# Patient Record
Sex: FEMALE | Race: WHITE | NOT HISPANIC OR LATINO | Employment: FULL TIME | URBAN - METROPOLITAN AREA
[De-identification: names, ages, dates, MRNs, and addresses within clinical notes are randomized per-mention and may not be internally consistent; named-entity substitution may affect disease eponyms.]

---

## 2021-09-23 ENCOUNTER — TREATMENT (OUTPATIENT)
Dept: PHYSICAL THERAPY | Facility: CLINIC | Age: 31
End: 2021-09-23

## 2021-09-23 DIAGNOSIS — R26.81 UNSTEADY GAIT: ICD-10-CM

## 2021-09-23 DIAGNOSIS — Z47.89 ORTHOPEDIC AFTERCARE: Primary | ICD-10-CM

## 2021-09-23 DIAGNOSIS — M25.551 RIGHT HIP PAIN: ICD-10-CM

## 2021-09-23 PROCEDURE — 97110 THERAPEUTIC EXERCISES: CPT | Performed by: PHYSICAL THERAPIST

## 2021-09-23 PROCEDURE — 97162 PT EVAL MOD COMPLEX 30 MIN: CPT | Performed by: PHYSICAL THERAPIST

## 2021-09-23 NOTE — PROGRESS NOTES
Physical Therapy Initial Evaluation and Plan of Care      Patient: Maria R Chiu   : 1990  Diagnosis/ICD-10 Code:  Orthopedic aftercare [Z47.89]  Referring practitioner: Berna Blair*  Date of Initial Visit: 2021  Today's Date: 2021  Patient seen for 1 sessions           Subjective Evaluation    History of Present Illness  Date of surgery: 2021  Mechanism of injury: No DEANNA    Subjective comment: 3 wks post (R) hip arthroscopy with femoral decompression/ labral repair.  2/ 10 pain (R) hip pain  Patient Occupation:  at [employer] Quality of life: good    Pain  Current pain ratin  At best pain ratin  At worst pain ratin  Quality: dull ache, tight, throbbing, sharp, cramping and discomfort  Aggravating factors: ambulation, overhead activity, stairs, lifting, movement and standing    Patient Goals  Patient goals for therapy: increased strength, independence with ADLs/IADLs, return to work, increased motion, improved balance, decreased pain and decreased edema             Objective          Passive Range of Motion     Right Hip   Flexion: 90 degrees   Abduction: 10 degrees     Strength/Myotome Testing     Additional Strength Details  Deferred MMT (R) LE .  WB precaution of 30%. Ambulating with use of swx. Encouraged use of rolling wx . Reviewed correct sleeping position.  Reviewed precautions/ restricxtions.     General Comments     Hip Comments   5 min ambulation tolerance. Decreased independence with dressing/ grooming bathing tasks. Pain with all transfers 4 hr sleep tolerance.        See Exercise, Manual, and Modality Logs for complete treatment.       Functional Outcome Score: 100% impaired LE fx score        Assessment & Plan     Assessment  Impairments: abnormal gait, abnormal or restricted ROM, impaired balance, impaired physical strength, pain with function and safety issue  Assessment details: Maria R Chiu is a 31 y.o. year-old female referred to physical  therapy for (R) hip arthroscopy/ debridement/ decompression/ labral repair.  She presents with a evolving clinical presentation.  Pt reports 4/ 10 pain . Compliant with WB precautions. She has no  comorbidities and no personal factors that may affect her progress in the plan of care. Pt demonstrates decreased (R) hip AROM/ MMT throughout consistent with dx at this time.  Chief complaint decreased tolerance with safety functional mobility around her home.  Pt ambulating with use of swx 30% WB precautions (R) LE. Pt demonstrates Signs and symptoms are consistent with physical therapy diagnosis of unsteady gait/ (R) hip pain/ orthopedic aftercare   Prognosis: good  Functional Limitations: walking, uncomfortable because of pain, standing and stooping  Goals  Plan Goals: stg 6 wks    Pt to educated/ independent with initial HEP specific to PROM/ AAROM/ precautions.    Increase (R) hip PROM in flexion 90 degrees to allow for increased ease with dressing activities.    Pt to ambulate with correct gait sequence with use of appropriate AD consistently.    ltg 12 wks    Pt to deny pain with all transfers.    Decreased (R) hip pain at rest to 2/ 10 to allow for 6 hrs continuous sleep.    Increased (R) hip PROM in flexion 110 degrees/ 25 degrees in abduction to allow for increase ease with dressing/bathing activities.    Increase (R) hip MMT 4-/5 to allow for 20 min standing/ walking tolerance without evidence of increase pain/ LE fatigue.     Improve LE fx score by 25%  improve    Plan  Therapy options: will be seen for skilled physical therapy services  Planned modality interventions: cryotherapy, electrical stimulation/Russian stimulation, TENS, ultrasound and thermotherapy (hydrocollator packs)  Planned therapy interventions: abdominal trunk stabilization, manual therapy, motor coordination training, neuromuscular re-education, balance/weight-bearing training, ADL retraining, flexibility, functional ROM exercises, gait  training, home exercise program, joint mobilization, transfer training, therapeutic activities, strengthening, stretching and soft tissue mobilization  Frequency: 2x week  Duration in weeks: 12  Treatment plan discussed with: patient        Timed:  Manual Therapy:     mins  98290;  Therapeutic Exercise:   10      mins  96983;     Neuromuscular Rashida:        mins  99651;    Therapeutic Activity:          mins  59761;     Gait Training:           mins  70386;     Ultrasound:          mins  23370;    Electrical Stimulation:         mins  74949 ( );  Iontophoresis         mins 12257  Dry Needling        mins      Untimed:  Electrical Stimulation:         mins  91575 ( );  Mechanical Traction:         mins  28871;     Timed Treatment:40      mins   Total Treatment:     40   mins    PT SIGNATURE: David Ho, PT   DATE TREATMENT INITIATED: 9/23/2021    Initial Certification  Certification Period: 12/22/2021  I certify that the therapy services are furnished while this patient is under my care.  The services outlined above are required by this patient, and will be reviewed every 90 days.     PHYSICIAN: Berna العراقي PA      DATE:     Please sign and return via fax to [phone] Thank you, [facility].

## 2021-09-27 ENCOUNTER — TREATMENT (OUTPATIENT)
Dept: PHYSICAL THERAPY | Facility: CLINIC | Age: 31
End: 2021-09-27

## 2021-09-27 DIAGNOSIS — Z47.89 ORTHOPEDIC AFTERCARE: Primary | ICD-10-CM

## 2021-09-27 DIAGNOSIS — R26.81 UNSTEADY GAIT: ICD-10-CM

## 2021-09-27 DIAGNOSIS — M25.551 RIGHT HIP PAIN: ICD-10-CM

## 2021-09-27 PROCEDURE — 97110 THERAPEUTIC EXERCISES: CPT | Performed by: PHYSICAL THERAPIST

## 2021-09-27 PROCEDURE — 97530 THERAPEUTIC ACTIVITIES: CPT | Performed by: PHYSICAL THERAPIST

## 2021-09-27 NOTE — PROGRESS NOTES
Physical Therapy Daily Progress Note      Patient: Maria R Chiu   : 1990  Referring practitioner: Berna Blair*  Date of Initial Visit: Type: THERAPY  Noted: 2021  Today's Date: 2021  Patient seen for 2 sessions         Maria R Chiu reports: pain has been pretty controlled and her 4 weeks is up on Wednesday this week and she is hoping to progress to increased WBing through RLE.     Objective     Exercises:   NuStep: 5 min, seat 11, resistance level 1    Supine on therapy table  -Heel slides x 10   -Alt marches (from hooklying) with core activation x 10 each leg  -iso hip ADD with ball (hooklying) x 10 with 5 sec hold  -hip ABD (hooklying) x 10 with 5 sec hold  -SAQ over pillow x 10  -RLE clamshells in L sidelying x 10    Seated edge of therapy table:   -LAQ x 15  -Ankle circles CW/CCW x 10 each    Ther Activity:  Sit to/from stands from bench x 3 with RWx; cues/educated on hand placement on bench vs pulling up on RWx  SPS transfers with RWx w/c to/from Nustep and w/c to/from therapy mat with close S and cues for hand placement and technique; increased time due to PWB RLE.     Gait:  15 feet x 2 with RWx with partial WB on RLE; sig reliance via UE on RWx; cues/educated on gait mechanics until full WB      Medbridge HEP code: [id number]    Assessment/Plan  Continued partial WB at 30% RLE; progressed open chain exercises to limited WB but to improve RLE hip ROM and strength. Pt reports 4 week post op is Wednesday with hopeful progression of WB soon. Updated HEP and issued handout this date for improved carryover at home. Education on transfers and gait today; increased height of RWx to improve PWB RLE and ease of mobility. Pt also educated on supine positioning without knee flexion to stretch hip flexor and improve hip ext in prep for gait training in future sessions.        Progress per Plan of Care and Progress strengthening /stabilization /functional activity        Timed:  Manual Therapy:    -     mins  67472;  Therapeutic Exercise:    31     mins  88406;     Neuromuscular Rashida:    -    mins  38332;    Therapeutic Activity:     8     mins  76059;     Gait Trainin     mins  31436;     Ultrasound:     -     mins  56539;    Electrical Stimulation:    -     mins  73653 ( );  Dry needling:      -     mins  /    Untimed:  Electrical Stimulation:    -     mins  52140 ( );  Mechanical Traction:    -     mins  24895;     Timed Treatment:   44   mins   Total Treatment:     46   mins  Dayana Polanco PT  Physical Therapist

## 2021-10-01 ENCOUNTER — TREATMENT (OUTPATIENT)
Dept: PHYSICAL THERAPY | Facility: CLINIC | Age: 31
End: 2021-10-01

## 2021-10-01 DIAGNOSIS — R26.81 UNSTEADY GAIT: ICD-10-CM

## 2021-10-01 DIAGNOSIS — Z47.89 ORTHOPEDIC AFTERCARE: ICD-10-CM

## 2021-10-01 DIAGNOSIS — M25.551 RIGHT HIP PAIN: Primary | ICD-10-CM

## 2021-10-01 PROCEDURE — 97110 THERAPEUTIC EXERCISES: CPT | Performed by: PHYSICAL THERAPIST

## 2021-10-01 NOTE — PROGRESS NOTES
Physical Therapy Daily Progress Note    Patient: Maria R Chiu   : 1990  Diagnosis/ICD-10 Code:  Right hip pain [M25.551]  Referring practitioner: Berna Blair*  Date of Initial Visit: Type: THERAPY  Noted: 2021  Today's Date: 10/1/2021  Patient seen for 3 sessions           Subjective motion and pain is better.     Objective   See Exercise, Manual, and Modality Logs for complete treatment.       Assessment/Plan  Improved (R) hip flexion 95 .  Verbal cuing encouraged increased WB 30-50% per pt tolerance.  Independent with all transfers.          Timed:    Manual Therapy:        mins  91458;  Therapeutic Exercise:      40   mins  32318;     Neuromuscular Rashida:        mins  89509;    Therapeutic Activity:          mins  65975;     Gait Training:           mins  17534;     Ultrasound:          mins  57946;    Electrical Stimulation:         mins  15992 ( );  Iontophoresis         mins 24693;  Aquatic Therapy         mins 71535;  Dry Needling                   mins    Untimed:  Electrical Stimulation:         mins  14378 ( );  Mechanical Traction:         mins  56425;     Timed Treatment:   40   mins   Total Treatment:     40   mins  David Ho, PT  Physical Therapist

## 2021-10-05 ENCOUNTER — TREATMENT (OUTPATIENT)
Dept: PHYSICAL THERAPY | Facility: CLINIC | Age: 31
End: 2021-10-05

## 2021-10-05 DIAGNOSIS — R26.81 UNSTEADY GAIT: ICD-10-CM

## 2021-10-05 DIAGNOSIS — Z47.89 ORTHOPEDIC AFTERCARE: Primary | ICD-10-CM

## 2021-10-05 DIAGNOSIS — M25.551 RIGHT HIP PAIN: ICD-10-CM

## 2021-10-05 PROCEDURE — 97110 THERAPEUTIC EXERCISES: CPT | Performed by: PHYSICAL THERAPIST

## 2021-10-05 PROCEDURE — 97116 GAIT TRAINING THERAPY: CPT | Performed by: PHYSICAL THERAPIST

## 2021-10-05 NOTE — PROGRESS NOTES
Physical Therapy Daily Progress Note    Patient: Maria R Chiu   : 1990  Diagnosis/ICD-10 Code:  Orthopedic aftercare [Z47.89]  Referring practitioner: Berna Blair*  Date of Initial Visit: Type: THERAPY  Noted: 2021  Today's Date: 10/5/2021  Patient seen for 4 sessions           Subjective 4/ 10 soreness in hip.    Objective   See Exercise, Manual, and Modality Logs for complete treatment.       Assessment/Plan    Reviewed precautions/ restriction/ gait training/ progressed with hip PROM/AAROM/AROM per pt tolerance. Fatigued at conclusion. Compliant with HEP. Pain 3/10 post treatment.        Timed:    Manual Therapy:        mins  44364;  Therapeutic Exercise:   27     mins  66180;     Neuromuscular Rashida:        mins  01617;    Therapeutic Activity:          mins  57006;     Gait Trainin      mins  21083;     Ultrasound:          mins  05146;    Electrical Stimulation:         mins  26457 ( );  Iontophoresis         mins 34226;  Aquatic Therapy         mins 46235;  Dry Needling                   mins    Untimed:  Electrical Stimulation:         mins  16073 ( );  Mechanical Traction:         mins  49115;     Timed Treatment:   35   mins   Total Treatment:     35   mins  David Ho, PT  Physical Therapist

## 2021-10-08 ENCOUNTER — TREATMENT (OUTPATIENT)
Dept: PHYSICAL THERAPY | Facility: CLINIC | Age: 31
End: 2021-10-08

## 2021-10-08 DIAGNOSIS — M25.551 RIGHT HIP PAIN: Primary | ICD-10-CM

## 2021-10-08 DIAGNOSIS — R26.81 UNSTEADY GAIT: ICD-10-CM

## 2021-10-08 DIAGNOSIS — Z47.89 ORTHOPEDIC AFTERCARE: ICD-10-CM

## 2021-10-08 PROCEDURE — 97110 THERAPEUTIC EXERCISES: CPT | Performed by: PHYSICAL THERAPIST

## 2021-10-08 NOTE — PROGRESS NOTES
Physical Therapy Daily Progress Note    Patient: Maria R Chiu   : 1990  Diagnosis/ICD-10 Code:  Right hip pain [M25.551]  Referring practitioner: Berna Blair*  Date of Initial Visit: Type: THERAPY  Noted: 2021  Today's Date: 10/8/2021  Patient seen for 5 sessions           Subjective having moments where its feeling better and I can put a little more weight into my (R) LE.     Objective   See Exercise, Manual, and Modality Logs for complete treatment.       Assessment/Plan    Progressed with AAROM . Full revolution on airdyne.  Improving WB tolerance on (R) LE to 40-50%. Ambulating with use of RWx.        Timed:    Manual Therapy:         mins  52532;  Therapeutic Exercise:    40     mins  72119;     Neuromuscular Rashida:        mins  30337;    Therapeutic Activity:          mins  61010;     Gait Training:           mins  96578;     Ultrasound:          mins  06535;    Electrical Stimulation:         mins  66439 ( );  Iontophoresis         mins 29493;  Aquatic Therapy         mins 26899;  Dry Needling                   mins    Untimed:  Electrical Stimulation:         mins  16038 ( );  Mechanical Traction:         mins  32506;     Timed Treatment:  40    mins   Total Treatment:     40   mins  David Ho, PT  Physical Therapist

## 2021-10-11 ENCOUNTER — TREATMENT (OUTPATIENT)
Dept: PHYSICAL THERAPY | Facility: CLINIC | Age: 31
End: 2021-10-11

## 2021-10-11 DIAGNOSIS — M25.551 RIGHT HIP PAIN: Primary | ICD-10-CM

## 2021-10-11 DIAGNOSIS — Z47.89 ORTHOPEDIC AFTERCARE: ICD-10-CM

## 2021-10-11 PROCEDURE — 97110 THERAPEUTIC EXERCISES: CPT | Performed by: PHYSICAL THERAPIST

## 2021-10-11 PROCEDURE — 97116 GAIT TRAINING THERAPY: CPT | Performed by: PHYSICAL THERAPIST

## 2021-10-12 NOTE — PROGRESS NOTES
Physical Therapy Daily Progress Note    Patient: Maria R Chiu   : 1990  Diagnosis/ICD-10 Code:  Right hip pain [M25.551]  Referring practitioner: Berna Blair*  Date of Initial Visit: Type: THERAPY  Noted: 2021  Today's Date: 10/12/2021  Patient seen for 6 sessions           Subjective im getting more weight into my leg . Less sensitive    Objective   See Exercise, Manual, and Modality Logs for complete treatment.       Assessment/Plan    Pt reports 3/ 10 (R) hip pain;  Improving tolerance with airdyne bike full revolution. 50% WB (R) LE pt is 5 wks s/p;  No groin pain noted this visits. Independent with sit-stand transfer       Timed:    Manual Therapy:       mins  84013;  Therapeutic Exercise:     35    mins  29013;     Neuromuscular Rashida:        mins  85272;    Therapeutic Activity:          mins  35675;     Gait Training:       10    mins  07315;     Ultrasound:          mins  54793;    Electrical Stimulation:         mins  94482 ( );  Iontophoresis         mins 55260;  Aquatic Therapy         mins 28560;  Dry Needling                   mins    Untimed:  Electrical Stimulation:         mins  76610 ( );  Mechanical Traction:        mins  35806;     Timed Treatment:  45    mins   Total Treatment:  45    mins  David Ho, PT  Physical Therapist

## 2021-10-13 ENCOUNTER — TREATMENT (OUTPATIENT)
Dept: PHYSICAL THERAPY | Facility: CLINIC | Age: 31
End: 2021-10-13

## 2021-10-13 DIAGNOSIS — M25.551 RIGHT HIP PAIN: Primary | ICD-10-CM

## 2021-10-13 DIAGNOSIS — Z47.89 ORTHOPEDIC AFTERCARE: ICD-10-CM

## 2021-10-13 DIAGNOSIS — R26.81 UNSTEADY GAIT: ICD-10-CM

## 2021-10-13 PROCEDURE — 97116 GAIT TRAINING THERAPY: CPT | Performed by: PHYSICAL THERAPIST

## 2021-10-13 PROCEDURE — 97110 THERAPEUTIC EXERCISES: CPT | Performed by: PHYSICAL THERAPIST

## 2021-10-13 NOTE — PROGRESS NOTES
Physical Therapy Daily Progress Note    Patient: Maria R Chiu   : 1990  Diagnosis/ICD-10 Code:  Right hip pain [M25.551]  Referring practitioner: Berna Blair*  Date of Initial Visit: Type: THERAPY  Noted: 2021  Today's Date: 10/13/2021  Patient seen for 7 sessions           Subjective   Maria R reports her hip pain has been a little more manageable lately. She feels like she has gotten over a madiha.    Objective   See Exercise, Manual, and Modality Logs for complete treatment.       Assessment/Plan   Focused on trying to push walker more continuously with gait to allow for smoother step through pattern and the slowly decrease reliance on UE support. Patient required a few standing rest breaks due to groin pain. Also added side stepping along rail to strengthen hip abductors.            Timed:    Manual Therapy:         mins  10484;  Therapeutic Exercise:    25     mins  98774;     Neuromuscular Rashida:        mins  61726;    Therapeutic Activity:          mins  55598;     Gait Training:      10     mins  45215;     Ultrasound:          mins  61686;    Electrical Stimulation:         mins  59457 ( );  Iontophoresis         mins 63249;  Aquatic Therapy         mins 09470;  Dry Needling                   mins    Untimed:  Electrical Stimulation:         mins  39639 ( );  Mechanical Traction:         mins  34740;     Timed Treatment:   35   mins   Total Treatment:     35   mins  Shawna Acosta PT  Physical Therapist

## 2021-10-18 ENCOUNTER — TREATMENT (OUTPATIENT)
Dept: PHYSICAL THERAPY | Facility: CLINIC | Age: 31
End: 2021-10-18

## 2021-10-18 DIAGNOSIS — M25.551 RIGHT HIP PAIN: Primary | ICD-10-CM

## 2021-10-18 DIAGNOSIS — Z47.89 ORTHOPEDIC AFTERCARE: ICD-10-CM

## 2021-10-18 DIAGNOSIS — R26.81 UNSTEADY GAIT: ICD-10-CM

## 2021-10-18 PROCEDURE — 97530 THERAPEUTIC ACTIVITIES: CPT | Performed by: PHYSICAL THERAPIST

## 2021-10-18 PROCEDURE — 97116 GAIT TRAINING THERAPY: CPT | Performed by: PHYSICAL THERAPIST

## 2021-10-18 PROCEDURE — 97110 THERAPEUTIC EXERCISES: CPT | Performed by: PHYSICAL THERAPIST

## 2021-10-18 NOTE — PROGRESS NOTES
Physical Therapy Daily Progress Note    Patient: Maria R Chiu   : 1990  Diagnosis/ICD-10 Code:  Right hip pain [M25.551]  Referring practitioner: Berna Blair*  Date of Initial Visit: Type: THERAPY  Noted: 2021  Today's Date: 10/18/2021  Patient seen for 8 sessions           Subjective   Patient reports she can tell she is pushing through the RW a little less with her arms. She feels more comfortable walking very short distances without it.    Objective   See Exercise, Manual, and Modality Logs for complete treatment.       Assessment/Plan  Discussed possibility of switching to straight cane for short household distances as patient increases her tolerance to R LE weightbearing. She demonstrated a much smoother gait pattern today and was able to progress to 2 sets of bridges and clamshells. Still some hip pain noted with last few reps of sit to stands.          Timed:    Manual Therapy:         mins  99312;  Therapeutic Exercise:    23     mins  75739;     Neuromuscular Rashida:        mins  21000;    Therapeutic Activity:    8      mins  83262;     Gait Training:      10     mins  54468;     Ultrasound:          mins  94551;    Electrical Stimulation:         mins  99001 ( );  Iontophoresis         mins 91155;  Aquatic Therapy         mins 36541;  Dry Needling                   mins    Untimed:  Electrical Stimulation:         mins  23049 ( );  Mechanical Traction:         mins  14898;     Timed Treatment:   41   mins   Total Treatment:     41   mins  Shawna Acosta PT  Physical Therapist

## 2021-10-21 ENCOUNTER — TREATMENT (OUTPATIENT)
Dept: PHYSICAL THERAPY | Facility: CLINIC | Age: 31
End: 2021-10-21

## 2021-10-21 DIAGNOSIS — M25.551 RIGHT HIP PAIN: Primary | ICD-10-CM

## 2021-10-21 DIAGNOSIS — Z47.89 ORTHOPEDIC AFTERCARE: ICD-10-CM

## 2021-10-21 PROCEDURE — 97110 THERAPEUTIC EXERCISES: CPT | Performed by: PHYSICAL THERAPIST

## 2021-10-21 PROCEDURE — 97116 GAIT TRAINING THERAPY: CPT | Performed by: PHYSICAL THERAPIST

## 2021-10-21 NOTE — PROGRESS NOTES
Physical Therapy Daily Progress Note    Patient: Maria R Chiu   : 1990  Diagnosis/ICD-10 Code:  Right hip pain [M25.551]  Referring practitioner: Berna Blair*  Date of Initial Visit: Type: THERAPY  Noted: 2021  Today's Date: 10/21/2021  Patient seen for 9 sessions           Subjective walking better. Loading LE better.     Objective   See Exercise, Manual, and Modality Logs for complete treatment.       Assessment/Plan    Weaning into spc with gait. The mornings only per pt tolerance.  Improving WB tolerance (R) hip AROM 105/ ab 25.  No pain with transfers/ independent with transfers. All stg met       Timed:    Manual Therapy:        mins  42037;  Therapeutic Exercise:    30    mins  57676;     Neuromuscular Rashida:       mins  95673;    Therapeutic Activity:          mins  89830;     Gait Training:     10      mins  70716;     Ultrasound:          mins  82240;    Electrical Stimulation:         mins  15019 ( );  Iontophoresis         mins 67333;  Aquatic Therapy         mins 11599;  Dry Needling                   mins    Untimed:  Electrical Stimulation:         mins  49468 ( );  Mechanical Traction:         mins  70080;     Timed Treatment:      40mins   Total Treatment:   40    mins  David Ho, PT  Physical Therapist

## 2021-10-25 ENCOUNTER — TREATMENT (OUTPATIENT)
Dept: PHYSICAL THERAPY | Facility: CLINIC | Age: 31
End: 2021-10-25

## 2021-10-25 DIAGNOSIS — M25.551 RIGHT HIP PAIN: ICD-10-CM

## 2021-10-25 DIAGNOSIS — R26.81 UNSTEADY GAIT: ICD-10-CM

## 2021-10-25 DIAGNOSIS — Z47.89 ORTHOPEDIC AFTERCARE: Primary | ICD-10-CM

## 2021-10-25 PROCEDURE — 97116 GAIT TRAINING THERAPY: CPT | Performed by: PHYSICAL THERAPIST

## 2021-10-25 PROCEDURE — 97110 THERAPEUTIC EXERCISES: CPT | Performed by: PHYSICAL THERAPIST

## 2021-10-25 PROCEDURE — 97530 THERAPEUTIC ACTIVITIES: CPT | Performed by: PHYSICAL THERAPIST

## 2021-10-25 NOTE — PROGRESS NOTES
Physical Therapy Daily Progress Note    Patient: Maria R Chiu   : 1990  Diagnosis/ICD-10 Code:  Orthopedic aftercare [Z47.89]  Referring practitioner: Berna Blair*  Date of Initial Visit: Type: THERAPY  Noted: 2021  Today's Date: 10/25/2021  Patient seen for 10 sessions           Subjective walking with cane more    Objective   See Exercise, Manual, and Modality Logs for complete treatment.       Assessment/Plan  Verbal cuing to increase stride length with gait w use of spc.  Practiced ascending-descending stairs. Implemented (R) hip AROM/ stabilization per pt tolerance.  Fatigued at conclusion.  15 min ambulation tolerance with use of spc. Follows up with ortho thurs.          Timed:    Manual Therapy:        mins  73625;  Therapeutic Exercise:      20   mins  25545;     Neuromuscular Rashida:        mins  49084;    Therapeutic Activity:   12       mins  69641;     Gait Trainin     mins  25992;     Ultrasound:          mins  43744;    Electrical Stimulation:         mins  77923 ( );  Iontophoresis         mins 69661;  Aquatic Therapy         mins 16841;  Dry Needling                   mins    Untimed:  Electrical Stimulation:         mins  26839 ( );  Mechanical Traction:         mins  20276;     Timed Treatment:   40   mins   Total Treatment:     40   mins  David Ho, PT  Physical Therapist

## 2021-10-27 ENCOUNTER — TREATMENT (OUTPATIENT)
Dept: PHYSICAL THERAPY | Facility: CLINIC | Age: 31
End: 2021-10-27

## 2021-10-27 DIAGNOSIS — Z47.89 ORTHOPEDIC AFTERCARE: Primary | ICD-10-CM

## 2021-10-27 DIAGNOSIS — R26.81 UNSTEADY GAIT: ICD-10-CM

## 2021-10-27 DIAGNOSIS — M25.551 RIGHT HIP PAIN: ICD-10-CM

## 2021-10-27 PROCEDURE — 97110 THERAPEUTIC EXERCISES: CPT | Performed by: PHYSICAL THERAPIST

## 2021-10-28 NOTE — PROGRESS NOTES
30-Day / 10-Visit Progress Note         Patient: Maria R Chiu   : 1990  Diagnosis/ICD-10 Code:  Orthopedic aftercare [Z47.89]  Referring practitioner: Berna Blair*  Date of Initial Visit: Type: THERAPY  Noted: 2021  Today's Date: 10/28/2021  Patient seen for 11 sessions      Subjective:     Clinical Progress: improved  Home Program Compliance: Yes  Treatment has included:  therapeutic exercise    Subjective Evaluation    History of Present Illness    Subjective comment: im walking better. im more confidence with getting weight into my hip.  pain 4/10Quality of life: good    Pain  Current pain rating: 3  At best pain rating: 3  At worst pain ratin  Quality: dull ache, tight and throbbing  Aggravating factors: ambulation, squatting, stairs, standing, movement and lifting    Patient Goals  Patient goals for therapy: increased strength, independence with ADLs/IADLs, decreased pain, improved balance, return to sport/leisure activities and increased motion         Objective     See Exercise, Manual, and Modality Logs for complete treatment.         Assessment & Plan     Assessment  Impairments: abnormal gait, abnormal or restricted ROM, impaired balance, impaired physical strength, pain with function and safety issue  Assessment details: Maria R Chiu is a 31 y.o. year-old female referred to physical therapy for (R) hip arthroscopy/ debridement/ decompression/ labral repair.  She presents with a evolving clinical presentation.  Pt reports 4/ 10 pain . Pt is 6 wks s/p. Pt has responded well with all intervention provided as evidence of increased tolerance with improving WB tolerance with use of spc with ambulation . Ambulation tolerance with spc 6-8 mins.  Pt denies night pain and is sleeping much better. (R) hip AROM in flexion 100; abduction 25 degrees. (R) quad MMT 4-/5, glute medius 3+/5, glute max 4-/5;  Decreased tolerance with ascending/ descending stairs. chief complaint  decreased tolerance with safety functional mobility around her home.  Signs and symptoms are consistent with physical therapy diagnosis of unsteady gait/ (R) hip pain/ orthopedic aftercare   Prognosis: good  Functional Limitations: walking, uncomfortable because of pain, standing and stooping    Prognosis: good  Functional Limitations: walking, uncomfortable because of pain, standing and stooping  Goals  Plan Goals: Goals  Plan Goals: stg 6 wks    Pt to educated/ independent with initial HEP specific to PROM/ AAROM/ precautions. met    Increase (R) hip PROM in flexion 90 degrees to allow for increased ease with dressing activities.met    Pt to ambulate with correct gait sequence with use of appropriate AD consistently.    Pt to tolerate 15 mins prolonged ambulation with use of appropriate AD without evidence of increased (R) hip pain > 2/10 consistently. ongoing  ltg 12 wks    Pt to deny pain with all transfers.met    Decreased (R) hip pain at rest to 2/ 10 to allow for 6 hrs continuous sleep. met    Increased (R) hip PROM in flexion 110 degrees/ 25 degrees in abduction to allow for increase ease with dressing/bathing activities.ongoing    Increase (R) hip MMT 4-/5 to allow for 20 min standing/ walking tolerance without evidence of increase pain/ LE fatigue. ongoing    Improve LE fx score by 25%ongoing    Pt to tolerate 25 mins prolonged ambulation with use of appropriate AD without evidence of increased (R) hip pain > 2/10 consistently. ongoing      Plan  Therapy options: will be seen for skilled physical therapy services  Planned modality interventions: cryotherapy, electrical stimulation/Russian stimulation, TENS, ultrasound and thermotherapy (hydrocollator packs)  Planned therapy interventions: abdominal trunk stabilization, manual therapy, motor coordination training, neuromuscular re-education, balance/weight-bearing training, ADL retraining, flexibility, functional ROM exercises, gait training, home exercise  program, joint mobilization, transfer training, therapeutic activities, strengthening, stretching and soft tissue mobilization  Frequency: 2x week  Duration in weeks: 12  Treatment plan discussed with: patient           Recommendations: Continue as planned  Timeframe: 1 month  Prognosis to achieve goals: good    PT Signature: David Ho, PT      Based upon review of the patient's progress and continued therapy plan, it is my medical opinion that Mraia R Chiu should continue physical therapy treatment at [facility]  [address] [place] [address] [address] [place] [address] [phone].    Signature: __________________________________  Berna العراقي PA    Timed:  Manual Therapy:         mins  50457;  Therapeutic Exercise:   45      mins  20647;     Neuromuscular Rashida:        mins  55835;    Therapeutic Activity:          mins  36824;     Gait Training:           mins  64222;     Ultrasound:          mins  06012;    Electrical Stimulation:         mins  70671 ( );  Iontophoresis         mins 07914;  Dry Needling                   mins    Untimed:  Electrical Stimulation:         mins  11858 ( );  Mechanical Traction:         mins  87687;     Timed Treatment:     45 mins   Total Treatment:     45   mins

## 2021-11-01 ENCOUNTER — TREATMENT (OUTPATIENT)
Dept: PHYSICAL THERAPY | Facility: CLINIC | Age: 31
End: 2021-11-01

## 2021-11-01 DIAGNOSIS — R26.81 UNSTEADY GAIT: ICD-10-CM

## 2021-11-01 DIAGNOSIS — Z47.89 ORTHOPEDIC AFTERCARE: ICD-10-CM

## 2021-11-01 DIAGNOSIS — M25.551 RIGHT HIP PAIN: Primary | ICD-10-CM

## 2021-11-01 PROCEDURE — 97110 THERAPEUTIC EXERCISES: CPT | Performed by: PHYSICAL THERAPIST

## 2021-11-01 PROCEDURE — 97116 GAIT TRAINING THERAPY: CPT | Performed by: PHYSICAL THERAPIST

## 2021-11-01 NOTE — PROGRESS NOTES
Physical Therapy Daily Progress Note    Patient: Maria R Chiu   : 1990  Diagnosis/ICD-10 Code:  Right hip pain [M25.551]  Referring practitioner: Berna Blair*  Date of Initial Visit: Type: THERAPY  Noted: 2021  Today's Date: 2021  Patient seen for 12 sessions           Subjective md said everything looks good. No restrictions    Objective   See Exercise, Manual, and Modality Logs for complete treatment.       Assessment/Plan       progressed with increased WB / closed chain strengthening per pt tolerance.   Improved iliopsoas flexibility.  R) hip flexion 112;  Weaning of spc in home.     Timed:    Manual Therapy:        mins  43654;  Therapeutic Exercise:35        mins  81976;     Neuromuscular Rashida:        mins  35628;    Therapeutic Activity:          mins  39215;     Gait Training:      10     mins  02043;     Ultrasound:          mins  32083;    Electrical Stimulation:         mins  59936 ( );  Iontophoresis         mins 30627;  Aquatic Therapy         mins 09867;  Dry Needling                   mins    Untimed:  Electrical Stimulation:         mins  36085 ( );  Mechanical Traction:         mins  06902;     Timed Treatment:  45    mins   Total Treatment:     45   mins  David Ho, PT  Physical Therapist

## 2021-11-03 ENCOUNTER — TREATMENT (OUTPATIENT)
Dept: PHYSICAL THERAPY | Facility: CLINIC | Age: 31
End: 2021-11-03

## 2021-11-03 DIAGNOSIS — R26.81 UNSTEADY GAIT: ICD-10-CM

## 2021-11-03 DIAGNOSIS — M25.551 RIGHT HIP PAIN: ICD-10-CM

## 2021-11-03 DIAGNOSIS — Z47.89 ORTHOPEDIC AFTERCARE: Primary | ICD-10-CM

## 2021-11-03 PROCEDURE — 97530 THERAPEUTIC ACTIVITIES: CPT | Performed by: PHYSICAL THERAPIST

## 2021-11-03 PROCEDURE — 97110 THERAPEUTIC EXERCISES: CPT | Performed by: PHYSICAL THERAPIST

## 2021-11-03 NOTE — PROGRESS NOTES
Physical Therapy Daily Progress Note    Patient: Maria R Chiu   : 1990  Diagnosis/ICD-10 Code:  Orthopedic aftercare [Z47.89]  Referring practitioner: Berna Blair*  Date of Initial Visit: Type: THERAPY  Noted: 2021  Today's Date: 11/3/2021  Patient seen for 13 sessions           Subjective hip seems to be getting better.  Walking without my cane some    Objective   See Exercise, Manual, and Modality Logs for complete treatment.       Assessment/Plan    Practiced ascending/ descending stairs reciprocally. Decreased difficulty stairs is noted with use of HR. Improved (R) hip AROM as evidence of independent with donning shoes.  Ambulating short distances 5 mins without use of ad. All stg met.        Timed:    Manual Therapy:        mins  21966;  Therapeutic Exercise:   27      mins  38081;     Neuromuscular Rashida:        mins  89068;    Therapeutic Activity:    8      mins  67205;     Gait Training:           mins  05875;     Ultrasound:          mins  48838;    Electrical Stimulation:         mins  34872 ( );  Iontophoresis         mins 08429;  Aquatic Therapy        mins 71550;  Dry Needling                   mins    Untimed:  Electrical Stimulation:         mins  53033 (MC );  Mechanical Traction:         mins  25365;     Timed Treatment:  35    mins   Total Treatment:    35   mins  David Ho, PT  Physical Therapist

## 2021-11-08 ENCOUNTER — TREATMENT (OUTPATIENT)
Dept: PHYSICAL THERAPY | Facility: CLINIC | Age: 31
End: 2021-11-08

## 2021-11-08 DIAGNOSIS — Z47.89 ORTHOPEDIC AFTERCARE: ICD-10-CM

## 2021-11-08 DIAGNOSIS — R26.81 UNSTEADY GAIT: ICD-10-CM

## 2021-11-08 DIAGNOSIS — M25.551 RIGHT HIP PAIN: Primary | ICD-10-CM

## 2021-11-08 PROCEDURE — 97110 THERAPEUTIC EXERCISES: CPT | Performed by: PHYSICAL THERAPIST

## 2021-11-08 PROCEDURE — 97530 THERAPEUTIC ACTIVITIES: CPT | Performed by: PHYSICAL THERAPIST

## 2021-11-08 NOTE — PROGRESS NOTES
Physical Therapy Daily Progress Note    Patient: Maria R Chiu   : 1990  Diagnosis/ICD-10 Code:  Right hip pain [M25.551]  Referring practitioner: Berna Blair*  Date of Initial Visit: Type: THERAPY  Noted: 2021  Today's Date: 2021  Patient seen for 14 sessions           Subjective hip is doing good. im walking longer 8 mins. Mild soreness intermittently.  I can tell im improving.    Objective   See Exercise, Manual, and Modality Logs for complete treatment.       Assessment/Plan    Progressed with hip AROM/ decrease iliopsoas ms spasm.  Progressed with quad/ glute medius / lumbar stabilization per pt tolerance. Reassessed ascending-descending stairs. Mild apprehension ascending stairs.  All stg met       Timed:    Manual Therapy:  mins  34460;  Therapeutic Exercise:    25    mins  63970;     Neuromuscular Rashida:        mins  21677;    Therapeutic Activity:     10     mins  98033;     Gait Training:           mins  96619;     Ultrasound:          mins  62511;    Electrical Stimulation:         mins  20933 ( );  Iontophoresis         mins 03638;  Aquatic Therapy         mins 14437;  Dry Needling                   mins    Untimed:  Electrical Stimulation:         mins  26615 (MC );  Mechanical Traction:         mins  19328;     Timed Treatment:  35    mins   Total Treatment:      35  mins  David Ho, PT  Physical Therapist

## 2021-11-10 ENCOUNTER — TREATMENT (OUTPATIENT)
Dept: PHYSICAL THERAPY | Facility: CLINIC | Age: 31
End: 2021-11-10

## 2021-11-10 DIAGNOSIS — Z47.89 ORTHOPEDIC AFTERCARE: Primary | ICD-10-CM

## 2021-11-10 DIAGNOSIS — M25.551 RIGHT HIP PAIN: ICD-10-CM

## 2021-11-10 PROCEDURE — 97116 GAIT TRAINING THERAPY: CPT | Performed by: PHYSICAL THERAPIST

## 2021-11-10 PROCEDURE — 97110 THERAPEUTIC EXERCISES: CPT | Performed by: PHYSICAL THERAPIST

## 2021-11-10 NOTE — PROGRESS NOTES
Physical Therapy Daily Progress Note    Patient: Maria R Chiu   : 1990  Diagnosis/ICD-10 Code:  Orthopedic aftercare [Z47.89]  Referring practitioner: Berna Blair*  Date of Initial Visit: Type: THERAPY  Noted: 2021  Today's Date: 11/10/2021  Patient seen for 15 sessions           Subjective hip is feeling good today    Objective   See Exercise, Manual, and Modality Logs for complete treatment.       Assessment/Plan  Progressed with hip AROm/ LE strengthening in aquatic setting.  enouraged increased stride with gait in the water. No pain with intervention provided. Fatigued at conclusion.          Timed:    Manual Therapy:       mins  93824;  Therapeutic Exercise:    25     mins  53460;     Neuromuscular Rashida:       mins  25519;    Therapeutic Activity:         mins  89811;     Gait Training:      10     mins  10622;     Ultrasound:          mins  15163;    Electrical Stimulation:         mins  97702 ( );  Iontophoresis         mins 28874;  Aquatic Therapy         mins 79695;  Dry Needling                   mins    Untimed:  Electrical Stimulation:         mins  09070 ( );  Mechanical Traction:         mins  75454;     Timed Treatment:   35   mins   Total Treatment:    35   mins  David Ho, PT  Physical Therapist

## 2021-11-15 ENCOUNTER — TREATMENT (OUTPATIENT)
Dept: PHYSICAL THERAPY | Facility: CLINIC | Age: 31
End: 2021-11-15

## 2021-11-15 DIAGNOSIS — Z47.89 ORTHOPEDIC AFTERCARE: Primary | ICD-10-CM

## 2021-11-15 DIAGNOSIS — M25.551 RIGHT HIP PAIN: ICD-10-CM

## 2021-11-15 DIAGNOSIS — R26.81 UNSTEADY GAIT: ICD-10-CM

## 2021-11-15 PROCEDURE — 97110 THERAPEUTIC EXERCISES: CPT | Performed by: PHYSICAL THERAPIST

## 2021-11-15 PROCEDURE — 97530 THERAPEUTIC ACTIVITIES: CPT | Performed by: PHYSICAL THERAPIST

## 2021-11-16 NOTE — PROGRESS NOTES
Physical Therapy Daily Progress Note    Patient: Maria R Chiu   : 1990  Diagnosis/ICD-10 Code:  Orthopedic aftercare [Z47.89]  Referring practitioner: Berna Blair*  Date of Initial Visit: Type: THERAPY  Noted: 2021  Today's Date: 2021  Patient seen for 16 sessions           Subjective   Hip is doing well. Endurance seems to be improving.   Objective   See Exercise, Manual, and Modality Logs for complete treatment.       Assessment/Plan  Progressed with stair climb climbing activities. Closed chain strengthening. Improving tolerance with posting on (R) LE. Mild -mod iliopsoas tightness.          Timed:    Manual Therapy:      mins  66259;  Therapeutic Exercise:    30     mins  45952;     Neuromuscular Rashida:        mins  84301;    Therapeutic Activity:  10        mins  46071;     Gait Training:           mins  61294;     Ultrasound:          mins  67393;    Electrical Stimulation:         mins  94017 ( );  Iontophoresis         mins 41836;  Aquatic Therapy         mins 10495;  Dry Needling                   mins    Untimed:  Electrical Stimulation:         mins  71995 ( );  Mechanical Traction:         mins  90281;     Timed Treatment:    40  mins   Total Treatment:     40   mins  David Ho, PT  Physical Therapist

## 2021-11-18 ENCOUNTER — TREATMENT (OUTPATIENT)
Dept: PHYSICAL THERAPY | Facility: CLINIC | Age: 31
End: 2021-11-18

## 2021-11-18 DIAGNOSIS — Z47.89 ORTHOPEDIC AFTERCARE: Primary | ICD-10-CM

## 2021-11-18 DIAGNOSIS — R26.81 UNSTEADY GAIT: ICD-10-CM

## 2021-11-18 DIAGNOSIS — M25.551 RIGHT HIP PAIN: ICD-10-CM

## 2021-11-18 PROCEDURE — 97113 AQUATIC THERAPY/EXERCISES: CPT | Performed by: PHYSICAL THERAPIST

## 2021-11-18 NOTE — PROGRESS NOTES
"Physical Therapy Daily Progress Note    Patient: Maria R Chiu   : 1990  Diagnosis/ICD-10 Code:  Orthopedic aftercare [Z47.89]  Referring practitioner: Berna Blair*  Date of Initial Visit: Type: THERAPY  Noted: 2021  Today's Date: 2021  Patient seen for 17 sessions             Subjective   Patient reports that right around 8 weeks she noticed a significant improvement in her pain level and overall mobility.     Objective   See Exercise, Manual, and Modality Logs for complete treatment.     AQUATIC EXERCISES:  Water Walk: forward/backwards 3 laps, side stepping 2 laps               Stretch 1: Hamstring hip sweeps with LN, 15x B                  Stretch 2: Modified piriformis stretch, LN under knee, 20 sec x 4                    Stretch 3: Quad stretch with LN, 30 sec                                      Clams: LN/Rail 20x                         Hip Abd/Add: 20x               Hip Flex/Ext: 20x  Hip Circles: 20/20  Leg Press: Blue ring x20                March in Place : 30x          Mini Squat: 15x                                     Uni-Clock: LN 1 min                   Step Ups:  8\" 20x F and L                    Bicycle : LN/Rail 2 min                                  Flutter/Scissor : 20x each with LN/Rail                          Assessment/Plan  Had patient place UE on noodle when performing hip exercises with L LE in order to work on R single leg balance. Patient is able to walk in the water without assistance with without any visible antalgia. Added some suspended exercises with noodle to help increase core stability.       Timed:  Aquatic Therapy    40     mins 63283;    Shawna Acosta, PT  Physical Therapist    [place] License: [id number]  "

## 2021-11-22 ENCOUNTER — TREATMENT (OUTPATIENT)
Dept: PHYSICAL THERAPY | Facility: CLINIC | Age: 31
End: 2021-11-22

## 2021-11-22 DIAGNOSIS — Z47.89 ORTHOPEDIC AFTERCARE: Primary | ICD-10-CM

## 2021-11-22 DIAGNOSIS — R26.81 UNSTEADY GAIT: ICD-10-CM

## 2021-11-22 DIAGNOSIS — M25.551 RIGHT HIP PAIN: ICD-10-CM

## 2021-11-22 PROCEDURE — 97110 THERAPEUTIC EXERCISES: CPT | Performed by: PHYSICAL THERAPIST

## 2021-11-22 PROCEDURE — 97530 THERAPEUTIC ACTIVITIES: CPT | Performed by: PHYSICAL THERAPIST

## 2021-11-22 NOTE — PROGRESS NOTES
Physical Therapy Daily Progress Note    Patient: Maria R Chiu   : 1990  Diagnosis/ICD-10 Code:  Orthopedic aftercare [Z47.89]  Referring practitioner: Berna Blair*  Date of Initial Visit: Type: THERAPY  Noted: 2021  Today's Date: 2021  Patient seen for 18 sessions           Subjective hip is doing better each week. ihave moments at work where I realize I sat too much  Hip gets sore    Objective   See Exercise, Manual, and Modality Logs for complete treatment.       Assessment/Plan  Progressed with stooping activities specific to management of home. progressed with hip AROM/ LE stregnthening per pt tolerance.          Timed:    Manual Therapy:      mins  83400;  Therapeutic Exercise:  27       mins  21725;     Neuromuscular Rashida:        mins  57944;    Therapeutic Activity:    10      mins  68963;     Gait Training:           mins  79559;     Ultrasound:          mins  64780;    Electrical Stimulation:         mins  52649 ( );  Iontophoresis         mins 51279;  Aquatic Therapy         mins 17426;  Dry Needling                   mins /  (Self-pay)    Untimed:  Electrical Stimulation:         mins  77216 ( );  Traction:         mins  01758;     Timed Treatment:  37    mins   Total Treatment:    37    mins    David Ho, PT  Physical Therapist    [place] [id number]

## 2021-11-24 ENCOUNTER — TREATMENT (OUTPATIENT)
Dept: PHYSICAL THERAPY | Facility: CLINIC | Age: 31
End: 2021-11-24

## 2021-11-24 DIAGNOSIS — Z47.89 ORTHOPEDIC AFTERCARE: Primary | ICD-10-CM

## 2021-11-24 DIAGNOSIS — R26.81 UNSTEADY GAIT: ICD-10-CM

## 2021-11-24 DIAGNOSIS — M25.551 RIGHT HIP PAIN: ICD-10-CM

## 2021-11-24 PROCEDURE — 97110 THERAPEUTIC EXERCISES: CPT | Performed by: PHYSICAL THERAPIST

## 2021-11-24 PROCEDURE — 97112 NEUROMUSCULAR REEDUCATION: CPT | Performed by: PHYSICAL THERAPIST

## 2021-11-24 NOTE — PROGRESS NOTES
Physical Therapy Daily Progress Note    Patient: Maria R Chiu   : 1990  Diagnosis/ICD-10 Code:  Orthopedic aftercare [Z47.89]  Referring practitioner: Berna Blair*  Date of Initial Visit: Type: THERAPY  Noted: 2021  Today's Date: 2021  Patient seen for 19 sessions           Subjective mild soreness in (R) hip today    Objective   See Exercise, Manual, and Modality Logs for complete treatment.       Assessment/Plan  Progressed pt in aquatic environment in frontal/ transverse plane mvmt patterns without evidence of pain . No pain with single limb balance activities.  No pain with kickboard.  Compliant with HEP. All stg met. Full hip aROM.          Timed:    Manual Therapy:         mins  19738;  Therapeutic Exercise: 25       mins  31753;     Neuromuscular Rashida:      10  mins  77191;    Therapeutic Activity:          mins  19137;     Gait Training:           mins  24088;     Ultrasound:          mins  12656;    Electrical Stimulation:         mins  90635 ( );  Iontophoresis         mins 44856;  Aquatic Therapy         mins 67385;  Dry Needling                   mins /  (Self-pay)    Untimed:  Electrical Stimulation:         mins  24677 ( );  Traction:         mins  77840;     Timed Treatment:  35    mins   Total Treatment:    35    mins    David Ho, PT  Physical Therapist    [place] License:[id number]

## 2021-12-01 ENCOUNTER — TREATMENT (OUTPATIENT)
Dept: PHYSICAL THERAPY | Facility: CLINIC | Age: 31
End: 2021-12-01

## 2021-12-01 DIAGNOSIS — R26.81 UNSTEADY GAIT: ICD-10-CM

## 2021-12-01 DIAGNOSIS — Z47.89 ORTHOPEDIC AFTERCARE: Primary | ICD-10-CM

## 2021-12-01 DIAGNOSIS — M25.551 RIGHT HIP PAIN: ICD-10-CM

## 2021-12-01 PROCEDURE — 97530 THERAPEUTIC ACTIVITIES: CPT | Performed by: PHYSICAL THERAPIST

## 2021-12-01 PROCEDURE — 97110 THERAPEUTIC EXERCISES: CPT | Performed by: PHYSICAL THERAPIST

## 2021-12-02 NOTE — PROGRESS NOTES
30-Day / 10-Visit Progress Note         Patient: Maria R Chiu   : 1990  Diagnosis/ICD-10 Code:  Orthopedic aftercare [Z47.89]  Referring practitioner: Berna Blair*  Date of Initial Visit: Type: THERAPY  Noted: 2021  Today's Date: 2021  Patient seen for 20 sessions      Subjective:     Clinical Progress: improved  Home Program Compliance: Yes  Treatment has included:  therapeutic exercise    Subjective   Objective     See Exercise, Manual, and Modality Logs for complete treatment.     Functional Outcome Score: timed sit-stand x 5 test 16 s    Assessment & Plan     Assessment  Impairments: abnormal gait, abnormal or restricted ROM, activity intolerance, impaired balance, impaired physical strength, pain with function and safety issue  Functional Limitations: carrying objects, walking, uncomfortable because of pain, standing and stooping  Assessment details: Maria R Chiu is a 31 y.o. year-old female referred to physical therapy for (R) hip arthroscopy/ debridement/ decompression/ labral repair.  She presents with a evolving clinical presentation.  Pt reports 4/ 10 pain . Pt is 12 wks s/p. Pt ambulating without antalgia without use of AD 20-25 min tolerance. improving(R) hip AROM in flexion 110; abduction 35 degrees. No apprehension with frontal plane movement patterns.   Improving (R) quad MMT 4/5, glute medius 4-/5, glute max 4-/5;  Decreased tolerance with ascending/ descending stairs. chief complaint concerns specific to prolonged standing/ walking at work > 30-45 mins. Pt is appropriate for recertification is progressing very well with all intervention provided. .  Signs and symptoms are consistent with physical therapy diagnosis of unsteady gait/ (R) hip pain/ orthopedic aftercare   Prognosis: good  Functional Limitations: walking, uncomfortable because of pain, standing and stooping    Prognosis: good  Functional Limitations: walking, uncomfortable because of pain, standing  and stooping    Prognosis: good    Goals  Plan Goals: Goals  Plan Goals: stg 6 wks    Pt to educated/ independent with initial HEP specific to PROM/ AAROM/ precautions. met    Increase (R) hip PROM in flexion 90 degrees to allow for increased ease with dressing activities.met    Pt to ambulate with correct gait sequence with use of appropriate AD consistently met.    Pt to tolerate 35 mins prolonged ambulation with use of appropriate AD without evidence of increased (R) hip pain > 2/10 consistently. Ongoing    Pt to tolerate frontal/ transverse plane mvmt patterns without evidence of apprehension consistently.  ongoing    Pt to d  ltg 12 wks    Pt to deny pain with all transfers.met    Decreased (R) hip pain at rest to 2/ 10 to allow for 6 hrs continuous sleep. met    Increased (R) hip PROM in flexion 110 degrees/ 25 degrees in abduction to allow for increase ease with dressing/bathing activities.met  Increase (R) hip MMT 4+5 to allow for improved tolerance with occupational tasks . ongoing    Improve LE fx score by 25%ongoing    Pt to tolerate 25 mins prolonged ambulation with use of appropriate AD without evidence of increased (R) hip pain > 2/10 consistently. ongoing      Plan  Therapy options: will be seen for skilled therapy services  Planned modality interventions: cryotherapy, electrical stimulation/Russian stimulation, TENS, ultrasound and thermotherapy (hydrocollator packs)  Planned therapy interventions: abdominal trunk stabilization, manual therapy, motor coordination training, neuromuscular re-education, balance/weight-bearing training, ADL retraining, flexibility, functional ROM exercises, gait training, home exercise program, joint mobilization, transfer training, therapeutic activities, strengthening, stretching and soft tissue mobilization  Frequency: 2x week  Duration in weeks: 12  Treatment plan discussed with: patient           Recommendations: Continue as planned  Timeframe: 1 month  Prognosis to  achieve goals: good    PT Signature: David Ho, PT    License Number: [id number]    Electronically signed by David Ho, PT, 12/02/21, 6:46 AM EST      Based upon review of the patient's progress and continued therapy plan, it is my medical opinion that Maria R Chiu should continue physical therapy treatment at [facility]  [address] [place] [address] [address]   [place] [address]  [phone].    Signature: __________________________________  Berna العراقي PA    Timed:  Manual Therapy:         mins  85464;  Therapeutic Exercise:      30   mins  67001;     Neuromuscular Rashida:        mins  40370;    Therapeutic Activity:  10        mins  93503;     Gait Training:           mins  81131;     Ultrasound:          mins  27010;    Iontophoresis         mins 56353;  Dry Needling                   mins 97639/71454 (Self-pay)    Untimed:  Electrical Stimulation:         mins  02473 ( );  Traction:         mins  73064;     Timed Treatment:   40   mins   Total Treatment:     40   mins

## 2021-12-03 ENCOUNTER — TREATMENT (OUTPATIENT)
Dept: PHYSICAL THERAPY | Facility: CLINIC | Age: 31
End: 2021-12-03

## 2021-12-03 DIAGNOSIS — R26.81 UNSTEADY GAIT: ICD-10-CM

## 2021-12-03 DIAGNOSIS — Z47.89 ORTHOPEDIC AFTERCARE: Primary | ICD-10-CM

## 2021-12-03 DIAGNOSIS — M25.551 RIGHT HIP PAIN: ICD-10-CM

## 2021-12-03 PROCEDURE — 97530 THERAPEUTIC ACTIVITIES: CPT | Performed by: PHYSICAL THERAPIST

## 2021-12-03 PROCEDURE — 97110 THERAPEUTIC EXERCISES: CPT | Performed by: PHYSICAL THERAPIST

## 2021-12-06 ENCOUNTER — TREATMENT (OUTPATIENT)
Dept: PHYSICAL THERAPY | Facility: CLINIC | Age: 31
End: 2021-12-06

## 2021-12-06 DIAGNOSIS — R26.81 UNSTEADY GAIT: ICD-10-CM

## 2021-12-06 DIAGNOSIS — M25.551 RIGHT HIP PAIN: Primary | ICD-10-CM

## 2021-12-06 DIAGNOSIS — Z47.89 ORTHOPEDIC AFTERCARE: ICD-10-CM

## 2021-12-06 PROCEDURE — 97530 THERAPEUTIC ACTIVITIES: CPT | Performed by: PHYSICAL THERAPIST

## 2021-12-06 PROCEDURE — 97110 THERAPEUTIC EXERCISES: CPT | Performed by: PHYSICAL THERAPIST

## 2021-12-06 NOTE — PROGRESS NOTES
Physical Therapy Daily Progress Note    Patient: Maria R Chiu   : 1990  Diagnosis/ICD-10 Code:  Orthopedic aftercare [Z47.89]  Referring practitioner: Berna Blair*  Date of Initial Visit: Type: THERAPY  Noted: 2021  Today's Date: 2021  Patient seen for 21 sessions           Subjective hip pain/ stiffness is controlled.  Endurance in hip is improving.     Objective   See Exercise, Manual, and Modality Logs for complete treatment.       Assessment/Plan       progressed with LE stabilization. No pain with frontal plane movement patterns. Improving glute medius MMT on (R) 4/     Timed:    Manual Therapy:         mins  03191;  Therapeutic Exercise: 30        mins  68718;     Neuromuscular Rashida:        mins  24080;    Therapeutic Activity:      10   mins  92268;     Gait Training:           mins  49621;     Ultrasound:          mins  46478;    Electrical Stimulation:         mins  26461 ( );  Iontophoresis         mins 92929;  Aquatic Therapy         mins 77216;  Dry Needling                   mins 09045/  (Self-pay)    Untimed:  Electrical Stimulation:         mins  57609 ( );  Traction:         mins  27188;     Timed Treatment:   40   mins   Total Treatment:  40      mins    David Ho PT  Physical Therapist    [place] [id number]

## 2021-12-06 NOTE — PROGRESS NOTES
Physical Therapy Daily Progress Note    Patient: Maria R Chiu   : 1990  Diagnosis/ICD-10 Code:  Right hip pain [M25.551]  Referring practitioner: Berna Blair*  Date of Initial Visit: Type: THERAPY  Noted: 2021  Today's Date: 2021  Patient seen for 22 sessions           Subjective follows up with orthopedics in 2 wks. My good days are getting better and my bad days are not as bad.     Objective   See Exercise, Manual, and Modality Logs for complete treatment.       Assessment/Plan    Decreased apprhension descending stairs.  Progressed with LE strengthening per pt tolerance. No apprehension with frontal/ transverse plane movement patterns.  All stg met. 20 min walking tolerance.  Fatigued at conclusion without increased symptoms of lbp/ hip pain       Timed:    Manual Therapy:        mins  21718;  Therapeutic Exercise:  25       mins  95299;     Neuromuscular Rashida:        mins  41015;    Therapeutic Activity:   8       mins  23887;     Gait Training:           mins  34503;     Ultrasound:          mins  17778;    Electrical Stimulation:         mins  98176 ( );  Iontophoresis         mins 22686;  Aquatic Therapy         mins 28685;  Dry Needling                   mins /  (Self-pay)    Untimed:  Electrical Stimulation:         mins  64131 ( );  Traction:         mins  57602;     Timed Treatment: 33     mins   Total Treatment:     33   mins    David oH, PT  Physical Therapist    [place] License:[id number]

## 2021-12-08 ENCOUNTER — TREATMENT (OUTPATIENT)
Dept: PHYSICAL THERAPY | Facility: CLINIC | Age: 31
End: 2021-12-08

## 2021-12-08 DIAGNOSIS — Z47.89 ORTHOPEDIC AFTERCARE: ICD-10-CM

## 2021-12-08 DIAGNOSIS — M25.551 RIGHT HIP PAIN: Primary | ICD-10-CM

## 2021-12-08 DIAGNOSIS — R26.81 UNSTEADY GAIT: ICD-10-CM

## 2021-12-08 PROCEDURE — 97110 THERAPEUTIC EXERCISES: CPT | Performed by: PHYSICAL THERAPIST

## 2021-12-08 PROCEDURE — 97112 NEUROMUSCULAR REEDUCATION: CPT | Performed by: PHYSICAL THERAPIST

## 2021-12-08 PROCEDURE — 97530 THERAPEUTIC ACTIVITIES: CPT | Performed by: PHYSICAL THERAPIST

## 2021-12-08 NOTE — PROGRESS NOTES
Physical Therapy Daily Progress Note    Patient: Maria R Chiu   : 1990  Diagnosis/ICD-10 Code:  Right hip pain [M25.551]  Referring practitioner: Berna Blair*  Date of Initial Visit: Type: THERAPY  Noted: 2021  Today's Date: 2021  Patient seen for 23 sessions           Subjective im getting a little soreness in (R) side of my back    Objective   See Exercise, Manual, and Modality Logs for complete treatment.       Assessment/Plan       encouraged sitting precaution/ cont with walking program per pt tolerance. Progressed with balacne training/ LE strengthening per pt tolerance. Decreased apprehension is noted with descending stairs. [id number] with use of AD. No pain with frontal plane movement patterns.    Timed:    Manual Therapy:         mins  31159;  Therapeutic Exercise:  22       mins  28005;     Neuromuscular Rashida: 8      mins  86295;    Therapeutic Activity:    10     mins  04882;     Gait Training:           mins  27214;     Ultrasound:          mins  37765;    Electrical Stimulation:         mins  06485 ( );  Iontophoresis         mins 31433;  Aquatic Therapy         mins 99110;  Dry Needling                   mins 68086/  (Self-pay)    Untimed:  Electrical Stimulation:         mins  53786 ( );  Traction:         mins  97198;     Timed Treatment:     40 mins   Total Treatment:     40   mins    David Ho, PT  Physical [id number]    [place] License:[id number]

## 2021-12-13 ENCOUNTER — TREATMENT (OUTPATIENT)
Dept: PHYSICAL THERAPY | Facility: CLINIC | Age: 31
End: 2021-12-13

## 2021-12-13 DIAGNOSIS — Z47.89 ORTHOPEDIC AFTERCARE: Primary | ICD-10-CM

## 2021-12-13 DIAGNOSIS — R26.81 UNSTEADY GAIT: ICD-10-CM

## 2021-12-13 DIAGNOSIS — M25.551 RIGHT HIP PAIN: ICD-10-CM

## 2021-12-13 PROCEDURE — 97110 THERAPEUTIC EXERCISES: CPT | Performed by: PHYSICAL THERAPIST

## 2021-12-13 PROCEDURE — 97530 THERAPEUTIC ACTIVITIES: CPT | Performed by: PHYSICAL THERAPIST

## 2021-12-14 NOTE — PROGRESS NOTES
Physical Therapy Daily Progress Note    Patient: Maria R Chiu   : 1990  Diagnosis/ICD-10 Code:  Orthopedic aftercare [Z47.89]  Referring practitioner: Berna Blair*  Date of Initial Visit: Type: THERAPY  Noted: 2021  Today's Date: 2021  Patient seen for 24 sessions           Subjective my back aches or gets tight every now and then. Functionally I feel much more confident about returning to work soon.     Objective   See Exercise, Manual, and Modality Logs for complete treatment.       Assessment/Plan  Full hip AROM decreased (R) iliopsoas tightness . Mild ttp (R) QL;  Reviewed health back program. 20 min walking tolerance. Increased (R) glute medius MMT 4/5 . No apprehension with sl work.          Timed:    Manual Therapy:       mins  45851;  Therapeutic Exercise:     30    mins  88922;     Neuromuscular Rashida:        mins  79851;    Therapeutic Activity:    10      mins  13541;     Gait Training:           mins  22204;     Ultrasound:          mins  43283;    Electrical Stimulation:         mins  17584 ( );  Iontophoresis         mins 75329;  Aquatic Therapy         mins 85200;  Dry Needling                   mins 63863/  (Self-pay)    Untimed:  Electrical Stimulation:         mins  25822 ( );  Traction:         mins  17163;     Timed Treatment:   40   mins   Total Treatment:     40   mins    David Ho PT  Physical Therapist    [place] License:[id number]

## 2021-12-15 ENCOUNTER — TREATMENT (OUTPATIENT)
Dept: PHYSICAL THERAPY | Facility: CLINIC | Age: 31
End: 2021-12-15

## 2021-12-15 DIAGNOSIS — M25.551 RIGHT HIP PAIN: ICD-10-CM

## 2021-12-15 DIAGNOSIS — R26.81 UNSTEADY GAIT: ICD-10-CM

## 2021-12-15 DIAGNOSIS — Z47.89 ORTHOPEDIC AFTERCARE: Primary | ICD-10-CM

## 2021-12-15 PROCEDURE — 97110 THERAPEUTIC EXERCISES: CPT | Performed by: PHYSICAL THERAPIST

## 2021-12-15 NOTE — PROGRESS NOTES
Physical Therapy Daily Progress Note    Patient: Maria R Chiu   : 1990  Diagnosis/ICD-10 Code:  Orthopedic aftercare [Z47.89]  Referring practitioner: Berna Blair*  Date of Initial Visit: Type: THERAPY  Noted: 2021  Today's Date: 12/15/2021  Patient seen for 25 sessions           Subjective md visit went well. Hip doing great. Mild (R) ql spasm    Objective   See Exercise, Manual, and Modality Logs for complete treatment.       Assessment/Plan    Progressed with functional endurance training/ LE -lumbar spine in a pain free fashion. Reviewed precautions with sitting/ encouraged decompression environment 2 x a day 8-10 mins specific to prone laying pillow under pelvis       Timed:    Manual Therapy:        mins  09051;  Therapeutic Exercise:    30     mins  72546;     Neuromuscular Rashida:        mins  63266;    Therapeutic Activity:          mins  15640;     Gait Training:           mins  09010;     Ultrasound:          mins  57861;    Electrical Stimulation:         mins  74533 ( );  Iontophoresis         mins 41329;  Aquatic Therapy         mins 00524;  Dry Needling                  mins 19655/  (Self-pay)    Untimed:  Electrical Stimulation:         mins  25514 ( );  Traction:         mins  29089;     Timed Treatment:  30    mins   Total Treatment:     30   mins    David Ho PT  Physical Therapist    [place] License:[id number]

## 2021-12-20 ENCOUNTER — TREATMENT (OUTPATIENT)
Dept: PHYSICAL THERAPY | Facility: CLINIC | Age: 31
End: 2021-12-20

## 2021-12-20 DIAGNOSIS — R26.81 UNSTEADY GAIT: ICD-10-CM

## 2021-12-20 DIAGNOSIS — Z47.89 ORTHOPEDIC AFTERCARE: Primary | ICD-10-CM

## 2021-12-20 DIAGNOSIS — M25.551 RIGHT HIP PAIN: ICD-10-CM

## 2021-12-20 PROCEDURE — 97110 THERAPEUTIC EXERCISES: CPT | Performed by: PHYSICAL THERAPIST

## 2021-12-20 NOTE — PROGRESS NOTES
Physical Therapy Daily Progress Note    Patient: Maria R Chiu   : 1990  Diagnosis/ICD-10 Code:  Orthopedic aftercare [Z47.89]  Referring practitioner: Berna Blair*  Date of Initial Visit: Type: THERAPY  Noted: 2021  Today's Date: 2021  Patient seen for 26 sessions           Subjective Evaluation    History of Present Illness    Subjective comment: Douing pretty good, did well over the weekend but my back has been bothering me some.  My back is fine today but last Thursday, I ended up taking 1/2 a muscle relaxer which helped.         Objective   See Exercise, Manual, and Modality Logs for complete treatment.       Assessment & Plan     Assessment    Assessment details: Focus on engaging core/hip for stability throughout session.  Patient demonstrates good tolerance with functional strengthening / endurance training and was able to progress reps and/or sets on a few.  Instructed patient to monitor for back symptoms following today's session and let PT know how she did when she returns later this week.                Timed:    Manual Therapy:    -     mins  73116;  Therapeutic Exercise:    39     mins  11693;     Neuromuscular Rashida:    -    mins  74184;    Therapeutic Activity:     -     mins  68953;     Gait Training:      -     mins  78626;     Ultrasound:     -     mins  15619;    Electrical Stimulation:    -     mins  61784 ( );  Iontophoresis    -     mins 90698;  Aquatic Therapy    -     mins 32473;  Dry Needling              -     mins 32324/ 90813 (Self-pay)    Untimed:  Electrical Stimulation:    -     mins  77198 ( );  Traction:    -     mins  12395;     Timed Treatment:   39   mins   Total Treatment:     -   mins    Cely Gaviria PT  Physical Therapist    [place] License:  [id number]

## 2021-12-22 ENCOUNTER — TREATMENT (OUTPATIENT)
Dept: PHYSICAL THERAPY | Facility: CLINIC | Age: 31
End: 2021-12-22

## 2021-12-22 DIAGNOSIS — R26.81 UNSTEADY GAIT: ICD-10-CM

## 2021-12-22 DIAGNOSIS — Z47.89 ORTHOPEDIC AFTERCARE: Primary | ICD-10-CM

## 2021-12-22 DIAGNOSIS — M25.551 RIGHT HIP PAIN: ICD-10-CM

## 2021-12-22 PROCEDURE — 97530 THERAPEUTIC ACTIVITIES: CPT | Performed by: PHYSICAL THERAPIST

## 2021-12-22 PROCEDURE — 97110 THERAPEUTIC EXERCISES: CPT | Performed by: PHYSICAL THERAPIST

## 2021-12-23 NOTE — PROGRESS NOTES
Physical Therapy Daily Progress Note    Patient: Maria R Chiu   : 1990  Diagnosis/ICD-10 Code:  Orthopedic aftercare [Z47.89]  Referring practitioner: Berna Blair*  Date of Initial Visit: Type: THERAPY  Noted: 2021  Today's Date: 2021  Patient seen for 27 sessions           Subjective endurance in my hip is improving. Back soreness lessoning.     Objective   See Exercise, Manual, and Modality Logs for complete treatment.       Assessment/Plan  Progressed with core trunk lumbar /hip stabilization per pt tolerance 20 min prolonged tolerance with ambulation at home.  No antalgia with gait. No pain with transfers. All stg met.         Timed:    Manual Therapy:       mins  95321;  Therapeutic Exercise:     35    mins  06528;     Neuromuscular Rashida:        mins  91825;    Therapeutic Activity:    10      mins  82046;     Gait Training:           mins  85621;     Ultrasound:          mins  50326;    Electrical Stimulation:         mins  66357 ( );  Iontophoresis         mins 33278;  Aquatic Therapy         mins 84040;  Dry Needling                   mins /  (Self-pay)    Untimed:  Electrical Stimulation:         mins  79159 ( );  Traction:         mins  75899;     Timed Treatment: 45     mins   Total Treatment:     45   mins    David Ho, PT  Physical Therapist    [place] [id number]

## 2022-01-05 ENCOUNTER — TELEPHONE (OUTPATIENT)
Dept: PHYSICAL THERAPY | Facility: CLINIC | Age: 32
End: 2022-01-05

## 2022-01-11 ENCOUNTER — TREATMENT (OUTPATIENT)
Dept: PHYSICAL THERAPY | Facility: CLINIC | Age: 32
End: 2022-01-11

## 2022-01-11 DIAGNOSIS — M25.551 RIGHT HIP PAIN: ICD-10-CM

## 2022-01-11 DIAGNOSIS — R26.81 UNSTEADY GAIT: ICD-10-CM

## 2022-01-11 DIAGNOSIS — Z47.89 ORTHOPEDIC AFTERCARE: Primary | ICD-10-CM

## 2022-01-11 PROCEDURE — 97110 THERAPEUTIC EXERCISES: CPT | Performed by: PHYSICAL THERAPIST

## 2022-01-11 PROCEDURE — 97530 THERAPEUTIC ACTIVITIES: CPT | Performed by: PHYSICAL THERAPIST

## 2022-01-11 PROCEDURE — 97112 NEUROMUSCULAR REEDUCATION: CPT | Performed by: PHYSICAL THERAPIST

## 2022-01-12 NOTE — PROGRESS NOTES
30-Day / 10-Visit Progress Note         Patient: Maria R Chiu   : 1990  Diagnosis/ICD-10 Code:  Orthopedic aftercare [Z47.89]  Referring practitioner: Berna Blair*  Date of Initial Visit: Type: THERAPY  Noted: 2021  Today's Date: 2022  Patient seen for 28 sessions      Subjective:     Clinical Progress: improved  Home Program Compliance: Yes  Treatment has included:  therapeutic exercise    Subjective Evaluation    History of Present Illness    Subjective comment: the last two weeks i feel like i am over the hump no pain, endurance is better. very encouraged with my progress.  i returned to work this week .     Objective     See Exercise, Manual, and Modality Logs for complete treatment.     Functional Outcome Score: (R) sle 50 degrees    Assessment & Plan     Assessment  Impairments: abnormal gait, abnormal or restricted ROM, activity intolerance, impaired balance, impaired physical strength, pain with function and safety issue  Functional Limitations: carrying objects, walking, uncomfortable because of pain, standing, stooping and unable to perform repetitive tasks  Assessment details: Maria R Chiu is a 31 y.o. year-old female referred to physical therapy for 4 months s/p (R) hip arthroscopy/ debridement/ decompression/ labral repair.  She presents with a evolving clinical presentation.  Pt reports 0/ 10 pain . . Pt ambulating without antalgia without use of AD 30 min tolerance. improving(R) hip AROM in flexion 110; abduction 35 degrees. No apprehension with frontal plane movement patterns.   Improving (R) quad MMT 4/5, glute medius 4/5, glute max 4/5; improving confidence with ascending/ descending stairs. Increased (R) sle 50 degrees. chief complaint concerns specific to prolonged standing/ walking at work > 45 mins.  Pt is appropriate for recertification is progressing very well with all intervention provided. .  Signs and symptoms are consistent with physical therapy  diagnosis of unsteady gait/ (R) hip pain/ orthopedic aftercare   Prognosis: good  Functional Limitations: walking, uncomfortable because of pain, standing and stooping    Prognosis: good  Functional Limitations: walking, uncomfortable because of pain, standing and stooping    Prognosis: good   Goals  Plan Goals: Goals  Plan Goals: stg 6 wks    Pt to educated/ independent with initial HEP specific to PROM/ AAROM/ precautions. met    Increase (R) hip PROM in flexion 90 degrees to allow for increased ease with dressing activities.met    Pt to ambulate with correct gait sequence with use of appropriate AD consistently met.    Pt to tolerate 35 mins prolonged ambulation with use of appropriate AD without evidence of increased (R) hip pain > 2/10 consistently. Ongoing    Pt to tolerate frontal/ transverse plane mvmt patterns without evidence of apprehension consistently.  ongoing    Pt to d  ltg 12 wks    Pt to deny pain with all transfers.met    Decreased (R) hip pain at rest to 2/ 10 to allow for 6 hrs continuous sleep. met    Increased (R) hip PROM in flexion 110 degrees/ 25 degrees in abduction to allow for increase ease with dressing/bathing activities.met  Increase (R) hip MMT 4+5 to allow for improved tolerance with occupational tasks . ongoing    Improve LE fx score by 25%ongoing    Pt to tolerate 25 mins prolonged ambulation with use of appropriate AD without evidence of increased (R) hip pain > 2/10 consistently. ongoing  Prognosis: good    Goals  Plan Goals: Maria R Chiu is a 31 y.o. year-old female referred to physical therapy for (R) hip arthroscopy/ debridement/ decompression/ labral repair.  She presents with a evolving clinical presentation.  Pt reports 4/ 10 pain . Pt is 12 wks s/p. Pt ambulating without antalgia without use of AD 20-25 min tolerance. improving(R) hip AROM in flexion 110; abduction 35 degrees. No apprehension with frontal plane movement patterns.   Improving (R) quad MMT 4/5, glute  medius 4-/5, glute max 4-/5;  Decreased tolerance with ascending/ descending stairs. chief complaint concerns specific to prolonged standing/ walking at work > 30-45 mins. Pt is appropriate for recertification is progressing very well with all intervention provided. .  Signs and symptoms are consistent with physical therapy diagnosis of unsteady gait/ (R) hip pain/ orthopedic aftercare   Prognosis: good  Functional Limitations: walking, uncomfortable because of pain, standing and stooping    Prognosis: good  Functional Limitations: walking, uncomfortable because of pain, standing and stooping    Prognosis: good   Goals  Plan Goals: Goals  Plan Goals: stg 6 wks    Pt to educated/ independent with initial HEP specific to PROM/ AAROM/ precautions. met    Increase (R) hip PROM in flexion 90 degrees to allow for increased ease with dressing activities.met    Pt to ambulate with correct gait sequence with use of appropriate AD consistently met.    Pt to tolerate 45 mins prolonged ambulation with use of appropriate AD without evidence of increased (R) hip pain > 2/10 consistently. Ongoing    Pt to tolerate frontal/ transverse plane mvmt patterns without evidence of apprehension consistently.  met    ltg 12 wks    Pt to deny pain with all transfers.met    Decreased (R) hip pain at rest to 2/ 10 to allow for 6 hrs continuous sleep. met    Increased (R) hip PROM in flexion 110 degrees/ 25 degrees in abduction to allow for increase ease with dressing/bathing activities.met  Increase (R) hip MMT 4+5 to allow for improved tolerance with occupational tasks . Ongoing  Improve single limb balance (R) = (L) to allow for increased ease with uneven surfaces. Ongoing    Independent with progressed HEP. ongoing    Improve LE fx score by 25%ongoing    Pt to tolerate 25 mins prolonged ambulation with use of appropriate AD without evidence of increased (R) hip pain > 2/10 consistently. ongoing    Plan  Therapy options: will be seen for  skilled therapy services  Planned modality interventions: cryotherapy, electrical stimulation/Russian stimulation, TENS, ultrasound and thermotherapy (hydrocollator packs)  Planned therapy interventions: abdominal trunk stabilization, manual therapy, motor coordination training, neuromuscular re-education, balance/weight-bearing training, ADL retraining, flexibility, functional ROM exercises, gait training, home exercise program, joint mobilization, transfer training, therapeutic activities, strengthening, stretching and soft tissue mobilization  Duration in weeks: 12  Treatment plan discussed with: patient           Recommendations: Continue as planned  Timeframe: 1 month  Prognosis to achieve goals: good    PT Signature: David Ho PT    License Number: [id number]    Electronically signed by David Ho PT, 01/12/22, 6:20 AM EST      Based upon review of the patient's progress and continued therapy plan, it is my medical opinion that Maria R Chiu should continue physical therapy treatment at [facility]  [address] [place] [address] [address] [place] [address]  [phone].    Signature: __________________________________  Berna العراقي PA    Timed:  Manual Therapy:         mins  76367;  Therapeutic Exercise:    20     mins  48420;     Neuromuscular Rashida:  8      mins  78285;    Therapeutic Activity:    12     mins  54636;     Gait Training:           mins  79018;     Ultrasound:          mins  09918;    Iontophoresis         mins 67330;  Dry Needling                   mins 59578/17534 (Self-pay)    Untimed:  Electrical Stimulation:         mins  56933 ( );  Traction:         mins  33027;     Timed Treatment:  40   mins   Total Treatment:     40   mins

## 2022-01-18 ENCOUNTER — TREATMENT (OUTPATIENT)
Dept: PHYSICAL THERAPY | Facility: CLINIC | Age: 32
End: 2022-01-18

## 2022-01-18 DIAGNOSIS — Z47.89 ORTHOPEDIC AFTERCARE: Primary | ICD-10-CM

## 2022-01-18 DIAGNOSIS — M25.551 RIGHT HIP PAIN: ICD-10-CM

## 2022-01-18 DIAGNOSIS — R26.81 UNSTEADY GAIT: ICD-10-CM

## 2022-01-18 PROCEDURE — 97112 NEUROMUSCULAR REEDUCATION: CPT | Performed by: PHYSICAL THERAPIST

## 2022-01-18 PROCEDURE — 97110 THERAPEUTIC EXERCISES: CPT | Performed by: PHYSICAL THERAPIST

## 2022-01-18 NOTE — PROGRESS NOTES
Physical Therapy Daily Progress Note    Patient: Maria R Chiu   : 1990  Diagnosis/ICD-10 Code:  Orthopedic aftercare [Z47.89]  Referring practitioner: Berna Blair*  Date of Initial Visit: Type: THERAPY  Noted: 2021  Today's Date: 2022  Patient seen for 29 sessions           Subjective hip is doing very well. Very excited about how I am doing now    Objective   See Exercise, Manual, and Modality Logs for complete treatment.       Assessment/Plan  Reviewed HEP/ progressed with LE strengthening/ balance training.  Improved (R) sle 60 degrees.  All stg met. Provided independence with progressed HEP dc is appropriate in 1-2 weeks. No antalgia with gait. No pain with descending stairs. 45 mins walking tolerance.          Timed:    Manual Therapy:         mins  78150;  Therapeutic Exercise:     32   mins  92565;     Neuromuscular Rashida:8        mins  57360;    Therapeutic Activity:         mins  06804;     Gait Training:           mins  79404;     Ultrasound:          mins  33003;    Electrical Stimulation:         mins  06104 ( );  Iontophoresis         mins 70675;  Aquatic Therapy         mins 36061;  Dry Needling                  mins 11955/  (Self-pay)    Untimed:  Electrical Stimulation:         mins  73097 ( );  Traction:         mins  68896;     Timed Treatment:   40   mins   Total Treatment:     40   mins    David Ho PT  Physical Therapist    [place] License:[id number]

## 2022-01-25 ENCOUNTER — TREATMENT (OUTPATIENT)
Dept: PHYSICAL THERAPY | Facility: CLINIC | Age: 32
End: 2022-01-25

## 2022-01-25 DIAGNOSIS — M25.551 RIGHT HIP PAIN: ICD-10-CM

## 2022-01-25 DIAGNOSIS — Z47.89 ORTHOPEDIC AFTERCARE: Primary | ICD-10-CM

## 2022-01-25 DIAGNOSIS — R26.81 UNSTEADY GAIT: ICD-10-CM

## 2022-01-25 PROCEDURE — 97110 THERAPEUTIC EXERCISES: CPT | Performed by: PHYSICAL THERAPIST

## 2022-01-25 PROCEDURE — 97530 THERAPEUTIC ACTIVITIES: CPT | Performed by: PHYSICAL THERAPIST

## 2022-01-25 NOTE — PROGRESS NOTES
Physical Therapy Daily Progress Note    Patient: Maria R Chiu   : 1990  Diagnosis/ICD-10 Code:  Orthopedic aftercare [Z47.89]  Referring practitioner: Berna Blair*  Date of Initial Visit: Type: THERAPY  Noted: 2021  Today's Date: 2022  Patient seen for 30 sessions           Subjective 3/ 10 soreness in the front part of my hip    Objective   See Exercise, Manual, and Modality Logs for complete treatment.       Assessment/Plan  Moderate ttp (R) iliopsoas. Progressed with glute medius/ lumbosacral stabilization.  Encouraged positional iliopsoas stretch at home. No antalgia with gait.  Mild cuing with HEP. All stg met.  Decreasing frequency of PT 1 x wk every two weeks. Pt agrees with POC and is satisfied with all intervention provided         Timed:    Manual Therapy:         mins  19635;  Therapeutic Exercise:    30     mins  69749;     Neuromuscular Rashida:        mins  36821;    Therapeutic Activity:      15    mins  45145;     Gait Training:           mins  98020;     Ultrasound:          mins  23838;    Electrical Stimulation:         mins  83874 ( );  Iontophoresis         mins 49579;  Aquatic Therapy         mins 84407;  Dry Needling                   mins 66819/  (Self-pay)    Untimed:  Electrical Stimulation:         mins  43998 ( );  Traction:         mins  30672;     Timed Treatment: 45     mins   Total Treatment:     45   mins    David Ho, PT  Physical Therapist    [place] License:[id number]

## 2022-01-31 ENCOUNTER — TELEPHONE (OUTPATIENT)
Dept: PHYSICAL THERAPY | Facility: CLINIC | Age: 32
End: 2022-01-31

## 2022-01-31 NOTE — TELEPHONE ENCOUNTER
PATIENT CALLED, VERBALIZED OVER THE WEEKEND HER HIP STARTED TO BOTHER HER AND WOULD LIKE TO SEE BRAD SOMETIME THIS WEEK. PATIENT IS ON WAIT LIST, PLEASE CONTACT WHEN OPENING COMES UP. THANK YOU!

## 2022-02-03 ENCOUNTER — TREATMENT (OUTPATIENT)
Dept: PHYSICAL THERAPY | Facility: CLINIC | Age: 32
End: 2022-02-03

## 2022-02-03 PROCEDURE — 97110 THERAPEUTIC EXERCISES: CPT | Performed by: PHYSICAL THERAPIST

## 2022-02-03 NOTE — PROGRESS NOTES
Physical Therapy Daily Progress Note    Patient: Maria R Chiu   : 1990  Diagnosis/ICD-10 Code:  No primary diagnosis found.  Referring practitioner: Berna Blair*  Date of Initial Visit: No linked episodes  Today's Date: 2/3/2022  Patient seen for Visit count could not be calculated. Make sure you are using a visit which is associated with an episode. sessions           Subjective exacerbation of (R) hip pain/ soreness. 9 days post exacerbation       Objective   See Exercise, Manual, and Modality Logs for complete treatment.       Assessment/Plan    Moderate ttp (R) QL/ gtb/ glute medius; pt is wbat without antalgia symptoms resolved some with AAROM hip;  Encouraged decreased WB at home.    (-) fabers/ pain with IR;  (R) hip AROM in flexion 105/ ab 30     Timed:    Manual Therapy:        mins  52944;  Therapeutic Exercise:   45      mins  98620;     Neuromuscular Rashida:        mins  52999;    Therapeutic Activity:          mins  17405;     Gait Training:           mins  19193;     Ultrasound:          mins  06449;    Electrical Stimulation:         mins  03814 ( );  Iontophoresis         mins 84742;  Aquatic Therapy         mins 92637;  Dry Needling                   mins 22592/  (Self-pay)    Untimed:  Electrical Stimulation:         mins  14949 ( );  Traction:         mins  52627;     Timed Treatment:    45  mins   Total Treatment:     45   mins    David Ho, PT  Physical Therapist    [place] License:[id number]

## 2022-02-07 ENCOUNTER — TREATMENT (OUTPATIENT)
Dept: PHYSICAL THERAPY | Facility: CLINIC | Age: 32
End: 2022-02-07

## 2022-02-07 DIAGNOSIS — R26.81 UNSTEADY GAIT: ICD-10-CM

## 2022-02-07 DIAGNOSIS — M25.551 RIGHT HIP PAIN: ICD-10-CM

## 2022-02-07 DIAGNOSIS — Z47.89 ORTHOPEDIC AFTERCARE: Primary | ICD-10-CM

## 2022-02-07 PROCEDURE — 97110 THERAPEUTIC EXERCISES: CPT | Performed by: PHYSICAL THERAPIST

## 2022-02-07 PROCEDURE — 97530 THERAPEUTIC ACTIVITIES: CPT | Performed by: PHYSICAL THERAPIST

## 2022-02-07 PROCEDURE — 97112 NEUROMUSCULAR REEDUCATION: CPT | Performed by: PHYSICAL THERAPIST

## 2022-02-07 NOTE — PROGRESS NOTES
Physical Therapy Daily Progress Note    Patient: Maria R Chiu   : 1990  Diagnosis/ICD-10 Code:  Orthopedic aftercare [Z47.89]  Referring practitioner: Berna Blair*  Date of Initial Visit: Type: THERAPY  Noted: 2021  Today's Date: 2022  Patient seen for 32 sessions           Subjective decreased (R) hip pain. More stiffness in nature.  Encouraged with my progress    Objective   See Exercise, Manual, and Modality Logs for complete treatment.       Assessment/Plan  Reviewed precautions/ restrictions.  Implemented hip AROM/ balance training/ LE strengthening /stair training. No pain with frontal plane activiites. (R) slb 11 s;  Mild iliopsoas ttp/ gtb          Timed:    Manual Therapy:         mins  36637;  Therapeutic Exercise: 17       mins  90824;     Neuromuscular Rashida:    8   mins  68886;    Therapeutic Activity:   10       mins  10570;     Gait Training:           mins  49176;     Ultrasound:          mins  53582;    Electrical Stimulation:         mins  82494 ( );  Iontophoresis         mins 74841;  Aquatic Therapy         mins 82406;  Dry Needling                   mins 26253/  (Self-pay)    Untimed:  Electrical Stimulation:         mins  79675 ( );  Traction:         mins  08494;     Timed Treatment: 35     mins   Total Treatment:  35     mins    David Ho PT  Physical Therapist    [place] License:[id number]

## 2022-02-10 ENCOUNTER — TREATMENT (OUTPATIENT)
Dept: PHYSICAL THERAPY | Facility: CLINIC | Age: 32
End: 2022-02-10

## 2022-02-10 DIAGNOSIS — Z47.89 ORTHOPEDIC AFTERCARE: Primary | ICD-10-CM

## 2022-02-10 DIAGNOSIS — M25.551 RIGHT HIP PAIN: ICD-10-CM

## 2022-02-10 DIAGNOSIS — R26.81 UNSTEADY GAIT: ICD-10-CM

## 2022-02-10 PROCEDURE — 97110 THERAPEUTIC EXERCISES: CPT | Performed by: PHYSICAL THERAPIST

## 2022-02-10 NOTE — PROGRESS NOTES
Physical Therapy Daily Progress Note    Patient: Maria R Chiu   : 1990  Diagnosis/ICD-10 Code:  Orthopedic aftercare [Z47.89]  Referring practitioner: Berna Blair*  Date of Initial Visit: Type: THERAPY  Noted: 2021  Today's Date: 2/10/2022  Patient seen for 33 sessions           Subjective intensity of pain is less in the front.  Apprehensive at times to not walk too much bc it gets sensitive    Objective   See Exercise, Manual, and Modality Logs for complete treatment.       Assessment/Plan    Increased (R) hip AROM in flexion 110/ ab 25 apprehensive;  Progressed with mild glute medius/ quad/ lumbar stabilization per pt tolerance. Pt notes relief of pain with mild iliopsoas stretching.        Timed:    Manual Therapy:        mins  02477;  Therapeutic Exercise:     40    mins  04612;     Neuromuscular Rashida:        mins  65054;    Therapeutic Activity:          mins  88510;     Gait Training:           mins  68086;     Ultrasound:          mins  71942;    Electrical Stimulation:         mins  39094 ( );  Iontophoresis         mins 10429;  Aquatic Therapy         mins 33768;  Dry Needling                   mins /  (Self-pay)    Untimed:  Electrical Stimulation:         mins  35693 ( );  Traction:         mins  98547;     Timed Treatment:  40    mins   Total Treatment:    40    mins    David Ho, PT  Physical Therapist    [place] [id number]

## 2022-02-14 ENCOUNTER — TREATMENT (OUTPATIENT)
Dept: PHYSICAL THERAPY | Facility: CLINIC | Age: 32
End: 2022-02-14

## 2022-02-14 DIAGNOSIS — R26.81 UNSTEADY GAIT: ICD-10-CM

## 2022-02-14 DIAGNOSIS — M25.551 RIGHT HIP PAIN: ICD-10-CM

## 2022-02-14 DIAGNOSIS — Z47.89 ORTHOPEDIC AFTERCARE: Primary | ICD-10-CM

## 2022-02-14 PROCEDURE — 97110 THERAPEUTIC EXERCISES: CPT | Performed by: PHYSICAL THERAPIST

## 2022-02-14 NOTE — PROGRESS NOTES
Physical Therapy Daily Progress Note    Patient: Maria R Chiu   : 1990  Diagnosis/ICD-10 Code:  Orthopedic aftercare [Z47.89]  Referring practitioner: Berna Blair*  Date of Initial Visit: Type: THERAPY  Noted: 2021  Today's Date: 2022  Patient seen for 34 sessions           Subjective mild lumbar pain (R)     Objective   See Exercise, Manual, and Modality Logs for complete treatment.       Assessment/Plan  Intermittent groin pain.  I went hike last weekend , happy I tolerated it most without increased symptoms. (R) hip AROM in flexion 115/ ab 30;          Timed:    Manual Therapy:       mins  61096;  Therapeutic Exercise:     40    mins  98123;     Neuromuscular Rashida:        mins  31823;    Therapeutic Activity:          mins  43238;     Gait Training:           mins  68431;     Ultrasound:          mins  91336;    Electrical Stimulation:         mins  98809 ( );  Iontophoresis         mins 34527;  Aquatic Therapy         mins 29510;  Dry Needling                   mins 55518/  (Self-pay)    Untimed:  Electrical Stimulation:         mins  23270 ( );  Traction:         mins  40222;     Timed Treatment:  40    mins   Total Treatment:    40    mins    David Ho PT  Physical Therapist    [place] License:[id number]

## 2022-03-28 ENCOUNTER — TREATMENT (OUTPATIENT)
Dept: PHYSICAL THERAPY | Facility: CLINIC | Age: 32
End: 2022-03-28

## 2022-03-28 DIAGNOSIS — M25.551 RIGHT HIP PAIN: ICD-10-CM

## 2022-03-28 DIAGNOSIS — Z47.89 ORTHOPEDIC AFTERCARE: Primary | ICD-10-CM

## 2022-03-28 PROCEDURE — 97164 PT RE-EVAL EST PLAN CARE: CPT | Performed by: PHYSICAL THERAPIST

## 2022-03-28 PROCEDURE — 97110 THERAPEUTIC EXERCISES: CPT | Performed by: PHYSICAL THERAPIST

## 2022-03-29 NOTE — PROGRESS NOTES
Re-Assessment / Re-Certification        Patient: Maria R Chiu   : 1990  Diagnosis/ICD-10 Code:  Orthopedic aftercare [Z47.89]  Referring practitioner: Berna Blair*  Date of Initial Visit: Type: THERAPY  Noted: 2021  Today's Date: 3/28/2022  Patient seen for 35 sessions      Subjective:     Clinical Progress: improved  Home Program Compliance: Yes  Treatment has included:  therapeutic exercise    Subjective Evaluation    History of Present Illness    Subjective comment: pt hasnt been seen in PT for 2 months secondary to returning work.  pt following up with PT 7 months post arthroscopy (R) hip.  pt reports to being doing well. chief complaint  at this time is specific to functional endurance limitations with prolonged standing/walking at work > 35-40 mins Quality of life: good    Pain  Current pain ratin  At best pain ratin  At worst pain rating: 3  Quality: dull ache, tight, cramping, discomfort, pulling and squeezing  Aggravating factors: ambulation, squatting, stairs, standing, movement, lifting and repetitive movement  Progression: improved    Patient Goals  Patient goals for therapy: increased strength, independence with ADLs/IADLs, decreased pain, improved balance and increased motion         Objective   See Exercise, Manual, and Modality Logs for complete treatment.         Assessment & Plan     Assessment  Impairments: abnormal gait, abnormal or restricted ROM, activity intolerance, impaired balance, impaired physical strength, lacks appropriate home exercise program, pain with function and safety issue  Functional Limitations: walking, uncomfortable because of pain, standing and stooping  Assessment details: . Maria R Chiu is a 31 y.o. year-old female referred to physical therapy for 7 months s/p (R) hip arthroscopy/ debridement/ decompression/ labral repair.  She presents with a evolving clinical presentation.  Pt reports 1/10 pain . . Pt ambulating without  antalgia without use of AD 35 min tolerance. improving(R) hip AROM in flexion 115; abduction 40 degrees. No apprehension with frontal plane movement patterns.   Improving (R) quad MMT 4+/5, glute medius 4+/5, glute max 4/5; improving confidence with ascending/ descending stairs. Increased (R) sle 50 degrees. chief complaint concerns specific to prolonged standing/ walking at work > 45 mins. (-) charity/ (-) scour test (R);  Pt is appropriate for recertification is progressing very well with all intervention provided. Chief  Compliant decreased tolerance with prolonged standing/ walking tasks > 45 mins secondary to increased tightness lumbopelvic girtle.  Signs and symptoms are consistent with physical therapy diagnosis of unsteady gait/ (R) hip pain/ orthopedic aftercare   Prognosis: good  Functional Limitations: walking, uncomfortable because of pain, standing and stooping      Prognosis: good   Goals  Plan Goals: Goals    Prognosis: good    Goals  Plan Goals: Plan Goals: stg 6 wks    Pt to educated/ independent with initial HEP specific to PROM/ AAROM/ precautions. met    Increase (R) hip PROM in flexion 90 degrees to allow for increased ease with dressing activities.met    Pt to ambulate with correct gait sequence with use of appropriate AD consistently met.    Pt to tolerate 45 mins prolonged ambulation with use of appropriate AD without evidence of increased (R) hip pain > 2/10 consistently. Ongoing    Pt to tolerate frontal/ transverse plane mvmt patterns without evidence of apprehension consistently.  met    Pt to d  ltg 12 wks    Pt to deny pain with all transfers.met    Decreased (R) hip pain at rest to 2/ 10 to allow for 6 hrs continuous sleep. met    Increased (R) hip PROM in flexion 110 degrees/ 25 degrees in abduction to allow for increase ease with dressing/bathing activities.met  Increase (R) hip MMT 4+5 to allow for improved tolerance with occupational tasks . ongoing    Improve LE fx score by 25%  met    Pt to tolerate 45 mins prolonged ambulation with use of appropriate AD without evidence of increased (R) hip pain > 1/10 consistently. ongong      Plan  Therapy options: will be seen for skilled therapy services  Planned modality interventions: cryotherapy, electrical stimulation/Russian stimulation, TENS, ultrasound and thermotherapy (hydrocollator packs)  Planned therapy interventions: abdominal trunk stabilization, manual therapy, motor coordination training, neuromuscular re-education, balance/weight-bearing training, ADL retraining, flexibility, functional ROM exercises, gait training, home exercise program, joint mobilization, transfer training, therapeutic activities, strengthening, stretching and soft tissue mobilization  Frequency: 1x week  Duration in weeks: 4  Treatment plan discussed with: patient           Recommendations: Continue as planned  Timeframe: 1 month  Prognosis to achieve goals: good    PT Signature: David Ho, PT      Based upon review of the patient's progress and continued therapy plan, it is my medical opinion that Maria R Chiu should continue physical therapy treatment at [facility]  [address] [place] [address] [address] [place] [address]  [phone].    Signature: __________________________________  Berna العراقي PA  [id number]    Timed:  Manual Therapy:      mins  08847;  Therapeutic Exercise:    25   mins  39922;     Neuromuscular Rashida:        mins  52604;    Therapeutic Activity:          mins  73098;     Gait Training:           mins  71068;     Ultrasound:          mins  57306;    Electrical Stimulation:         mins  05472 ( );  Iontophoresis         mins 93669;  Dry Needling                   mins 93787/94179 (Self-pay)    Untimed:  Electrical Stimulation:         mins  27332 ( );  Mechanical Traction:         mins  55581;   Re-eval  25 mins    Timed Treatment:  45    mins   Total Treatment:       45  mins

## 2022-04-12 ENCOUNTER — TREATMENT (OUTPATIENT)
Dept: PHYSICAL THERAPY | Facility: CLINIC | Age: 32
End: 2022-04-12

## 2022-04-12 DIAGNOSIS — R26.81 UNSTEADY GAIT: ICD-10-CM

## 2022-04-12 DIAGNOSIS — M25.551 RIGHT HIP PAIN: ICD-10-CM

## 2022-04-12 DIAGNOSIS — Z47.89 ORTHOPEDIC AFTERCARE: Primary | ICD-10-CM

## 2022-04-12 PROCEDURE — 97110 THERAPEUTIC EXERCISES: CPT | Performed by: PHYSICAL THERAPIST

## 2022-04-12 PROCEDURE — 97530 THERAPEUTIC ACTIVITIES: CPT | Performed by: PHYSICAL THERAPIST

## 2022-04-12 NOTE — PROGRESS NOTES
Physical Therapy Daily Progress Note    Patient: Maria R Chiu   : 1990  Diagnosis/ICD-10 Code:  Orthopedic aftercare [Z47.89]  Referring practitioner: Berna Blair*  Date of Initial Visit: Type: THERAPY  Noted: 2021  Today's Date: 2022  Patient seen for 36 sessions           Subjective hip is doing great.  Gets tired at times but pain is controlled    Objective   See Exercise, Manual, and Modality Logs for complete treatment.       Assessment/Plan  Mild (R) hip stiffnes in flexion > 115 degrees. Full hip Ab/ IR.  (R) glute medius MMT 4+/5;  Progressed with LE stabilization activities/exercises. Verbal cuing to continue stretching iliopsoas. All stg met.          Timed:    Manual Therapy:         mins  39250;  Therapeutic Exercise:     30    mins  44221;     Neuromuscular Rashida:        mins  24226;    Therapeutic Activity:  10        mins  44181;     Gait Training:           mins  28811;     Ultrasound:          mins  07866;    Electrical Stimulation:         mins  54879 ( );  Iontophoresis         mins 25422;  Aquatic Therapy         mins 83305;  Dry Needling                   mins /  (Self-pay)    Untimed:  Electrical Stimulation:         mins  79674 ( );  Traction:         mins  34047;     Timed Treatment:  40    mins   Total Treatment:    40   mins    David Ho PT  Physical Therapist    [place] License: [id number]

## 2022-04-26 ENCOUNTER — TREATMENT (OUTPATIENT)
Dept: PHYSICAL THERAPY | Facility: CLINIC | Age: 32
End: 2022-04-26

## 2022-04-26 DIAGNOSIS — R26.81 UNSTEADY GAIT: ICD-10-CM

## 2022-04-26 DIAGNOSIS — M25.551 RIGHT HIP PAIN: ICD-10-CM

## 2022-04-26 DIAGNOSIS — Z47.89 ORTHOPEDIC AFTERCARE: Primary | ICD-10-CM

## 2022-04-26 PROCEDURE — 97530 THERAPEUTIC ACTIVITIES: CPT | Performed by: PHYSICAL THERAPIST

## 2022-04-26 PROCEDURE — 97110 THERAPEUTIC EXERCISES: CPT | Performed by: PHYSICAL THERAPIST

## 2022-04-26 PROCEDURE — 97112 NEUROMUSCULAR REEDUCATION: CPT | Performed by: PHYSICAL THERAPIST

## 2022-04-26 NOTE — PROGRESS NOTES
Physical Therapy Daily Progress Note    Patient: Maria R Chiu   : 1990  Diagnosis/ICD-10 Code:  Orthopedic aftercare [Z47.89]  Referring practitioner: Berna Blair*  Date of Initial Visit: Type: THERAPY  Noted: 2021  Today's Date: 2022  Patient seen for 37 sessions           Subjective im doing very good.  So encouraged    Objective   See Exercise, Manual, and Modality Logs for complete treatment.       Assessment/Plan       full hip AROM no pain with transfers/ ascending/ descending stairs without apprehension.  45-60 min walking tolerance.  Independent with progressed HEP; (R) glute medius MMT 4+/5;  All goals are met pt appropriate for discharge. Pt agrees with POC and is satisifed with all intervention provided.     Timed:    Manual Therapy:         mins  64870;  Therapeutic Exercise:     22    mins  83258;     Neuromuscular Rashida:  8      mins  15672;    Therapeutic Activity:   10       mins  46213;     Gait Training:           mins  02524;     Ultrasound:          mins  41344;    Electrical Stimulation:         mins  96528 ( );  Iontophoresis         mins 62546;  Aquatic Therapy         mins 34457;  Dry Needling                   mins /  (Self-pay)    Untimed:  Electrical Stimulation:         mins  10812 ( );  Traction:         mins  12082;     Timed Treatment:   40   mins   Total Treatment:     40   mins    David Ho, PT  Physical Therapist    [place] License: [id number]